# Patient Record
Sex: FEMALE | Race: WHITE | NOT HISPANIC OR LATINO | Employment: PART TIME | URBAN - NONMETROPOLITAN AREA
[De-identification: names, ages, dates, MRNs, and addresses within clinical notes are randomized per-mention and may not be internally consistent; named-entity substitution may affect disease eponyms.]

---

## 2023-09-26 ENCOUNTER — HOSPITAL ENCOUNTER (EMERGENCY)
Facility: HOSPITAL | Age: 23
Discharge: HOME | End: 2023-09-26
Attending: EMERGENCY MEDICINE | Admitting: EMERGENCY MEDICINE
Payer: COMMERCIAL

## 2023-09-26 ENCOUNTER — APPOINTMENT (EMERGENCY)
Dept: CT IMAGING | Facility: HOSPITAL | Age: 23
End: 2023-09-26
Payer: COMMERCIAL

## 2023-09-26 VITALS
WEIGHT: 254.63 LBS | HEART RATE: 82 BPM | SYSTOLIC BLOOD PRESSURE: 130 MMHG | TEMPERATURE: 98.1 F | OXYGEN SATURATION: 97 % | RESPIRATION RATE: 16 BRPM | DIASTOLIC BLOOD PRESSURE: 68 MMHG

## 2023-09-26 DIAGNOSIS — R19.00 PELVIC MASS: Primary | ICD-10-CM

## 2023-09-26 DIAGNOSIS — R19.00 ABDOMINAL MASS: Primary | ICD-10-CM

## 2023-09-26 LAB
ALBUMIN SERPL BCP-MCNC: 4.2 G/DL (ref 3.5–5)
ALP SERPL-CCNC: 89 U/L (ref 34–104)
ALT SERPL W P-5'-P-CCNC: 11 U/L (ref 7–52)
ANION GAP SERPL CALCULATED.3IONS-SCNC: 8 MMOL/L
AST SERPL W P-5'-P-CCNC: 10 U/L (ref 13–39)
BACTERIA UR QL AUTO: ABNORMAL /HPF
BASOPHILS # BLD AUTO: 0.02 THOUSANDS/ÂΜL (ref 0–0.1)
BASOPHILS NFR BLD AUTO: 0 % (ref 0–1)
BILIRUB SERPL-MCNC: 0.57 MG/DL (ref 0.2–1)
BILIRUB UR QL STRIP: NEGATIVE
BUN SERPL-MCNC: 11 MG/DL (ref 5–25)
CALCIUM SERPL-MCNC: 9.4 MG/DL (ref 8.4–10.2)
CHLORIDE SERPL-SCNC: 106 MMOL/L (ref 96–108)
CLARITY UR: ABNORMAL
CO2 SERPL-SCNC: 23 MMOL/L (ref 21–32)
COLOR UR: YELLOW
CREAT SERPL-MCNC: 0.92 MG/DL (ref 0.6–1.3)
EOSINOPHIL # BLD AUTO: 0.01 THOUSAND/ÂΜL (ref 0–0.61)
EOSINOPHIL NFR BLD AUTO: 0 % (ref 0–6)
ERYTHROCYTE [DISTWIDTH] IN BLOOD BY AUTOMATED COUNT: 14.4 % (ref 11.6–15.1)
EXT PREGNANCY TEST URINE: NEGATIVE
EXT. CONTROL: NORMAL
GFR SERPL CREATININE-BSD FRML MDRD: 87 ML/MIN/1.73SQ M
GLUCOSE SERPL-MCNC: 102 MG/DL (ref 65–140)
GLUCOSE UR STRIP-MCNC: NEGATIVE MG/DL
HCT VFR BLD AUTO: 37.3 % (ref 34.8–46.1)
HGB BLD-MCNC: 11.7 G/DL (ref 11.5–15.4)
HGB UR QL STRIP.AUTO: NEGATIVE
IMM GRANULOCYTES # BLD AUTO: 0.04 THOUSAND/UL (ref 0–0.2)
IMM GRANULOCYTES NFR BLD AUTO: 0 % (ref 0–2)
KETONES UR STRIP-MCNC: ABNORMAL MG/DL
LEUKOCYTE ESTERASE UR QL STRIP: ABNORMAL
LYMPHOCYTES # BLD AUTO: 1.16 THOUSANDS/ÂΜL (ref 0.6–4.47)
LYMPHOCYTES NFR BLD AUTO: 10 % (ref 14–44)
MCH RBC QN AUTO: 25.7 PG (ref 26.8–34.3)
MCHC RBC AUTO-ENTMCNC: 31.4 G/DL (ref 31.4–37.4)
MCV RBC AUTO: 82 FL (ref 82–98)
MONOCYTES # BLD AUTO: 0.54 THOUSAND/ÂΜL (ref 0.17–1.22)
MONOCYTES NFR BLD AUTO: 5 % (ref 4–12)
MUCOUS THREADS UR QL AUTO: ABNORMAL
NEUTROPHILS # BLD AUTO: 10.16 THOUSANDS/ÂΜL (ref 1.85–7.62)
NEUTS SEG NFR BLD AUTO: 85 % (ref 43–75)
NITRITE UR QL STRIP: NEGATIVE
NON-SQ EPI CELLS URNS QL MICRO: ABNORMAL /HPF
NRBC BLD AUTO-RTO: 0 /100 WBCS
PH UR STRIP.AUTO: 7.5 [PH]
PLATELET # BLD AUTO: 292 THOUSANDS/UL (ref 149–390)
PMV BLD AUTO: 10.6 FL (ref 8.9–12.7)
POTASSIUM SERPL-SCNC: 4 MMOL/L (ref 3.5–5.3)
PROT SERPL-MCNC: 7.5 G/DL (ref 6.4–8.4)
PROT UR STRIP-MCNC: NEGATIVE MG/DL
RBC # BLD AUTO: 4.55 MILLION/UL (ref 3.81–5.12)
RBC #/AREA URNS AUTO: ABNORMAL /HPF
SODIUM SERPL-SCNC: 137 MMOL/L (ref 135–147)
SP GR UR STRIP.AUTO: 1.01 (ref 1–1.03)
UROBILINOGEN UR QL STRIP.AUTO: 0.2 E.U./DL
WBC # BLD AUTO: 11.93 THOUSAND/UL (ref 4.31–10.16)
WBC #/AREA URNS AUTO: ABNORMAL /HPF

## 2023-09-26 PROCEDURE — G1004 CDSM NDSC: HCPCS

## 2023-09-26 PROCEDURE — 36415 COLL VENOUS BLD VENIPUNCTURE: CPT | Performed by: EMERGENCY MEDICINE

## 2023-09-26 PROCEDURE — 87491 CHLMYD TRACH DNA AMP PROBE: CPT | Performed by: EMERGENCY MEDICINE

## 2023-09-26 PROCEDURE — 96361 HYDRATE IV INFUSION ADD-ON: CPT

## 2023-09-26 PROCEDURE — 85025 COMPLETE CBC W/AUTO DIFF WBC: CPT | Performed by: EMERGENCY MEDICINE

## 2023-09-26 PROCEDURE — 87591 N.GONORRHOEAE DNA AMP PROB: CPT | Performed by: EMERGENCY MEDICINE

## 2023-09-26 PROCEDURE — 96374 THER/PROPH/DIAG INJ IV PUSH: CPT

## 2023-09-26 PROCEDURE — 99285 EMERGENCY DEPT VISIT HI MDM: CPT | Performed by: EMERGENCY MEDICINE

## 2023-09-26 PROCEDURE — 74177 CT ABD & PELVIS W/CONTRAST: CPT

## 2023-09-26 PROCEDURE — 81025 URINE PREGNANCY TEST: CPT | Performed by: EMERGENCY MEDICINE

## 2023-09-26 PROCEDURE — 80053 COMPREHEN METABOLIC PANEL: CPT | Performed by: EMERGENCY MEDICINE

## 2023-09-26 PROCEDURE — 99284 EMERGENCY DEPT VISIT MOD MDM: CPT

## 2023-09-26 PROCEDURE — 81001 URINALYSIS AUTO W/SCOPE: CPT | Performed by: EMERGENCY MEDICINE

## 2023-09-26 PROCEDURE — 96375 TX/PRO/DX INJ NEW DRUG ADDON: CPT

## 2023-09-26 RX ORDER — DROPERIDOL 2.5 MG/ML
1.25 INJECTION, SOLUTION INTRAMUSCULAR; INTRAVENOUS ONCE
Status: COMPLETED | OUTPATIENT
Start: 2023-09-26 | End: 2023-09-26

## 2023-09-26 RX ORDER — KETOROLAC TROMETHAMINE 30 MG/ML
15 INJECTION, SOLUTION INTRAMUSCULAR; INTRAVENOUS ONCE
Status: COMPLETED | OUTPATIENT
Start: 2023-09-26 | End: 2023-09-26

## 2023-09-26 RX ADMIN — SODIUM CHLORIDE 1000 ML: 0.9 INJECTION, SOLUTION INTRAVENOUS at 09:03

## 2023-09-26 RX ADMIN — DROPERIDOL 1.25 MG: 2.5 INJECTION, SOLUTION INTRAMUSCULAR; INTRAVENOUS at 09:03

## 2023-09-26 RX ADMIN — IOHEXOL 100 ML: 350 INJECTION, SOLUTION INTRAVENOUS at 10:04

## 2023-09-26 RX ADMIN — KETOROLAC TROMETHAMINE 15 MG: 30 INJECTION, SOLUTION INTRAMUSCULAR; INTRAVENOUS at 09:03

## 2023-09-26 NOTE — DISCHARGE INSTRUCTIONS
Probable ovarian mass/cyst  Please contact gynecologist oncologist today for appointment  Return immediately if worse or any new symptoms  Tylenol 1000 mg every 6 hours as needed  and/or  Advil 400 mg every 6 hours as needed  May take both together

## 2023-09-26 NOTE — Clinical Note
Cheri Baez was seen and treated in our emergency department on 9/26/2023. Diagnosis:     Ace Christian  may return to work on return date. She may return on this date: 09/28/2023         If you have any questions or concerns, please don't hesitate to call.       Donnie Kirby, DO    ______________________________           _______________          _______________  Hospital Representative                              Date                                Time

## 2023-09-26 NOTE — ED PROVIDER NOTES
History  Chief Complaint   Patient presents with   • Abdominal Pain     RLQ abdominal pain that started during the night and woke her up and developed nausea. 14-year-old female describes waking up hours prior to arrival with urge to urinate/stooling some right lower abdominal discomfort, only urinated, no lower urinary tract symptoms, no change in vaginal discharge. Pain became severe and is currently mild. No vomiting, diarrhea or constipation. Father stated she felt warm, no rigors. No prior episodes. LMP last week, normal, uses condoms and sometimes Plan B. No prior pregnancies or STIs      History provided by:  Patient  Abdominal Pain  Pain location:  RLQ  Pain quality: aching    Pain radiates to:  Does not radiate  Pain severity:  Mild  Timing:  Constant  Progression:  Waxing and waning  Chronicity:  New  Context: not sick contacts and not trauma    Relieved by:  None tried  Worsened by: Movement  Ineffective treatments:  None tried  Associated symptoms: no chest pain and no shortness of breath    Risk factors: not pregnant        None       Past Medical History:   Diagnosis Date   • ADHD    • Autism    • Psychiatric disorder        History reviewed. No pertinent surgical history. History reviewed. No pertinent family history. I have reviewed and agree with the history as documented. E-Cigarette/Vaping   • E-Cigarette Use Never User      E-Cigarette/Vaping Substances     Social History     Tobacco Use   • Smoking status: Never   • Smokeless tobacco: Never   Vaping Use   • Vaping Use: Never used   Substance Use Topics   • Alcohol use: Yes     Comment: occasional   • Drug use: Never       Review of Systems   Respiratory: Negative for shortness of breath. Cardiovascular: Negative for chest pain. Gastrointestinal: Positive for abdominal pain. Physical Exam  Physical Exam  Vitals and nursing note reviewed.    Constitutional:       Comments: Pleasant, comfortable-appearing   HENT: Head: Normocephalic and atraumatic. Mouth/Throat:      Mouth: Mucous membranes are moist.      Pharynx: Oropharynx is clear. Eyes:      Conjunctiva/sclera: Conjunctivae normal.      Pupils: Pupils are equal, round, and reactive to light. Cardiovascular:      Rate and Rhythm: Normal rate and regular rhythm. Heart sounds: Normal heart sounds. Pulmonary:      Effort: Pulmonary effort is normal.      Breath sounds: Normal breath sounds. Abdominal:      General: Bowel sounds are normal. There is no distension. Palpations: Abdomen is soft. Tenderness: There is abdominal tenderness in the right lower quadrant. There is no right CVA tenderness, left CVA tenderness, guarding or rebound. Comments: No epigastric or right upper quadrant tenderness, right middle and right lower abdomen tender, mildly obese, distended   Musculoskeletal:         General: No deformity. Cervical back: Neck supple. Skin:     General: Skin is warm and dry. Neurological:      General: No focal deficit present. Mental Status: She is alert and oriented to person, place, and time. Cranial Nerves: No cranial nerve deficit. Coordination: Coordination normal.   Psychiatric:         Behavior: Behavior normal.         Thought Content:  Thought content normal.         Judgment: Judgment normal.         Vital Signs  ED Triage Vitals [09/26/23 0849]   Temperature Pulse Respirations Blood Pressure SpO2   98.1 °F (36.7 °C) 103 16 138/85 95 %      Temp Source Heart Rate Source Patient Position - Orthostatic VS BP Location FiO2 (%)   Temporal Monitor Sitting Right arm --      Pain Score       6           Vitals:    09/26/23 0849 09/26/23 1149   BP: 138/85 130/68   Pulse: 103 82   Patient Position - Orthostatic VS: Sitting          Visual Acuity      ED Medications  Medications   sodium chloride 0.9 % bolus 1,000 mL (0 mL Intravenous Stopped 9/26/23 0955)   ketorolac (TORADOL) injection 15 mg (15 mg Intravenous Given 9/26/23 0903)   droperidol (INAPSINE) injection 1.25 mg (1.25 mg Intravenous Given 9/26/23 0903)   iohexol (OMNIPAQUE) 350 MG/ML injection (SINGLE-DOSE) 100 mL (100 mL Intravenous Given 9/26/23 1004)       Diagnostic Studies  Results Reviewed     Procedure Component Value Units Date/Time    Urine Microscopic [[id number]]  (Abnormal) Collected: 09/26/23 0955    Lab Status: Final result Specimen: Urine, Clean Catch Updated: 09/26/23 1026     RBC, UA 0-1 /hpf      WBC, UA 4-10 /hpf      Epithelial Cells Moderate /hpf      Bacteria, UA Moderate /hpf      MUCUS THREADS Occasional    UA w Reflex to Microscopic w Reflex to Culture [[id number]]  (Abnormal) Collected: 09/26/23 0955    Lab Status: Final result Specimen: Urine, Clean Catch Updated: 09/26/23 1008     Color, UA Yellow     Clarity, UA Slightly Cloudy     Specific Gravity, UA 1.015     pH, UA 7.5     Leukocytes, UA Trace     Nitrite, UA Negative     Protein, UA Negative mg/dl      Glucose, UA Negative mg/dl      Ketones, UA Trace mg/dl      Urobilinogen, UA 0.2 E.U./dl      Bilirubin, UA Negative     Occult Blood, UA Negative    Chlamydia/GC amplified DNA by PCR [[id number]] Collected: 09/26/23 0955    Lab Status:  In process Specimen: Urine, Other Updated: 09/26/23 1001    POCT pregnancy, urine [[id number]]  (Normal) Resulted: 09/26/23 0955    Lab Status: Final result Updated: 09/26/23 0955     EXT Preg Test, Ur Negative     Control Valid    Comprehensive metabolic panel [[id number]]  (Abnormal) Collected: 09/26/23 0903    Lab Status: Final result Specimen: Blood from Arm, Left Updated: 09/26/23 0926     Sodium 137 mmol/L      Potassium 4.0 mmol/L      Chloride 106 mmol/L      CO2 23 mmol/L      ANION GAP 8 mmol/L      BUN 11 mg/dL      Creatinine 0.92 mg/dL      Glucose 102 mg/dL      Calcium 9.4 mg/dL      AST 10 U/L      ALT 11 U/L      Alkaline Phosphatase 89 U/L      Total Protein 7.5 g/dL      Albumin 4.2 g/dL      Total Bilirubin 0.57 mg/dL      eGFR 87 ml/min/1.73sq m     Narrative:      National Kid[employer] Disease Foundation guidelines for Chronic Kidney Disease (CKD):   •  Stage 1 with normal or high GFR (GFR > 90 mL/min/1.73 square meters)  •  Stage 2 Mild CKD (GFR = 60-89 mL/min/1.73 square meters)  •  Stage 3A Moderate CKD (GFR = 45-59 mL/min/1.73 square meters)  •  Stage 3B Moderate CKD (GFR = 30-44 mL/min/1.73 square meters)  •  Stage 4 Severe CKD (GFR = 15-29 mL/min/1.73 square meters)  •  Stage 5 End Stage CKD (GFR <15 mL/min/1.73 square meters)  Note: GFR calculation is accurate only with a steady state creatinine    CBC and differential [[id number]]  (Abnormal) Collected: 09/26/23 0903    Lab Status: Final result Specimen: Blood from Arm, Left Updated: 09/26/23 0910     WBC 11.93 Thousand/uL      RBC 4.55 Million/uL      Hemoglobin 11.7 g/dL      Hematocrit 37.3 %      MCV 82 fL      MCH 25.7 pg      MCHC 31.4 g/dL      RDW 14.4 %      MPV 10.6 fL      Platelets 950 Thousands/uL      nRBC 0 /100 WBCs      Neutrophils Relative 85 %      Immat GRANS % 0 %      Lymphocytes Relative 10 %      Monocytes Relative 5 %      Eosinophils Relative 0 %      Basophils Relative 0 %      Neutrophils Absolute 10.16 Thousands/µL      Immature Grans Absolute 0.04 Thousand/uL      Lymphocytes Absolute 1.16 Thousands/µL      Monocytes Absolute 0.54 Thousand/µL      Eosinophils Absolute 0.01 Thousand/µL      Basophils Absolute 0.02 Thousands/µL                  CT abdomen pelvis with contrast   Final Result by Carmelo Olszewski, MD (09/26 6747)      1. Indeterminate large primarily cystic pelvic and abdominal mass with multiple septations measuring up to 26.3 x 17.6 x 34 cm as described above. The mass is felt to arise from the left ovary though a large exophytic fibroid is technically possible    though felt to be much less likely. Within the mass some of the septated regions have fluid attenuation content. 2.  Trace abdominal and pelvic free fluid.    3.  Small umbilical hernia with extension of small component of the septated abdominal/pelvic mass into the hernia sac. 4.  Moderate hiatal hernia. 5.  Results were discussed directly with Dr. Virgilio Chong at 10:50 a.m. on 9/26/2023. Workstation performed: [id number]                    Procedures  Procedures         ED Course  ED Course as of 09/26/23 1216   Tue Sep 26, 2023   0931 WBC(!): 11.93   1050 Leukocytes, UA(!): Trace   1050 Nitrite, UA: Negative   1050 Epithelial Cells(!): Moderate   1050 Bacteria, UA(!): Moderate   1050 CT abdomen pelvis with contrast  Radiology notes large pelvic mass   [facility]   1213 Patient remains comfortable for close outpatient follow-up and voices good understanding    Gynecology oncology Dr. James Gibbs agreed with close outpatient follow-up and appointment made via Diane DO with Dr. Eliane Pat 20yo+    [facility] Most Recent Value   Initial Alcohol Screen: US AUDIT-C     1. How often do you have a drink containing alcohol? 0 Filed at: 09/26/2023 0852   2. How many drinks containing alcohol do you have on a typical day you are drinking? 0 Filed at: 09/26/2023 0852   3b. FEMALE Any Age, or MALE 65+: How often do you have 4 or more drinks on one occassion? 0 Filed at: 09/26/2023 0852   Audit-C Score 0 Filed at: 09/26/2023 3471   BENJI: How many times in the past year have you. .. Used an illegal drug or used a prescription medication for non-medical reasons? Never Filed at: 09/26/2023 1901                    Medical Decision Making  Abdominal mass: acute illness or injury  Amount and/or Complexity of Data Reviewed  Labs: ordered. Decision-making details documented in ED Course. Radiology: ordered. Decision-making details documented in ED Course. ECG/medicine tests: ordered and independent interpretation performed. Decision-making details documented in ED Course.       Risk  Prescription drug management. Disposition  Final diagnoses:   Abdominal mass     Time reflects when diagnosis was documented in both MDM as applicable and the Disposition within this note     Time User Action Codes Description Comment    9/26/2023 12:08 PM Juan Trinh Add [R19.00] Abdominal mass       ED Disposition     ED Disposition   Discharge    Condition   Stable    Date/Time   Tue Sep 26, 2023 12:13 PM    Comment   Areli Gavin discharge to home/self care. Follow-up Information     Follow up With Specialties Details Why Contact Info    Rolan Patel MD Gynecologic Oncology On 10/2/2023 11:15 with Dr Jak Dickey [address] [place] [address]            Patient's Medications    No medications on file       No discharge procedures on file.     PDMP Review     None          ED Provider  Electronically Signed by           Juan Trinh DO  09/26/23 7785

## 2023-09-27 LAB
C TRACH DNA SPEC QL NAA+PROBE: NEGATIVE
N GONORRHOEA DNA SPEC QL NAA+PROBE: NEGATIVE

## 2023-10-02 ENCOUNTER — CONSULT (OUTPATIENT)
Dept: GYNECOLOGIC ONCOLOGY | Facility: CLINIC | Age: 23
End: 2023-10-02
Payer: COMMERCIAL

## 2023-10-02 VITALS
DIASTOLIC BLOOD PRESSURE: 78 MMHG | TEMPERATURE: 98 F | WEIGHT: 249 LBS | OXYGEN SATURATION: 96 % | RESPIRATION RATE: 16 BRPM | HEART RATE: 117 BPM | HEIGHT: 66 IN | SYSTOLIC BLOOD PRESSURE: 120 MMHG | BODY MASS INDEX: 40.02 KG/M2

## 2023-10-02 DIAGNOSIS — N83.8 OVARIAN MASS: Primary | ICD-10-CM

## 2023-10-02 PROCEDURE — 99245 OFF/OP CONSLTJ NEW/EST HI 55: CPT | Performed by: OBSTETRICS & GYNECOLOGY

## 2023-10-02 RX ORDER — ACETAMINOPHEN 325 MG/1
975 TABLET ORAL ONCE
OUTPATIENT
Start: 2023-10-02 | End: 2023-10-02

## 2023-10-02 RX ORDER — HEPARIN SODIUM 5000 [USP'U]/ML
5000 INJECTION, SOLUTION INTRAVENOUS; SUBCUTANEOUS
OUTPATIENT
Start: 2023-10-03 | End: 2023-10-04

## 2023-10-02 RX ORDER — CELECOXIB 200 MG/1
200 CAPSULE ORAL ONCE
OUTPATIENT
Start: 2023-10-02 | End: 2023-10-02

## 2023-10-02 RX ORDER — GABAPENTIN 100 MG/1
100 CAPSULE ORAL ONCE
OUTPATIENT
Start: 2023-10-02 | End: 2023-10-02

## 2023-10-02 RX ORDER — CEFAZOLIN SODIUM 2 G/50ML
2000 SOLUTION INTRAVENOUS ONCE
OUTPATIENT
Start: 2023-10-02 | End: 2023-10-02

## 2023-10-02 RX ORDER — METHYLPHENIDATE HYDROCHLORIDE 27 MG/1
27 TABLET, EXTENDED RELEASE ORAL DAILY
COMMUNITY

## 2023-10-02 NOTE — ASSESSMENT & PLAN NOTE
The patient has been recently diagnosed with a ovarian mass. We have discussed that this very large mass that should be removed through an open incision to minimize likelihood of rupture due to its significant thickenings within the mass. We have quoted a risk of malignancy between 10 and 20%. We have stated that there is a good chance that this may be a borderline tumor. The patient remains interested in fertility and all attempts unless malignancy is involving contralateral ovary and uterus will be made to preserve fertility. We have recommended the following:    Exploratory laparotomy, left salpingo-oophorectomy, possible modified staging procedure, possible ALISA/BSO and debulking. Have discussed risks and benefits of the procedure including bleeding requiring transfusion infection, infection, damage to local structures including bowel bladder ureter and other local organs. We discussed the risk of deep venous thrombosis. An open procedure may be required. All of these complications are in the 2-4% range of likelihood. The patient understands the risks and benefits of the procedure and has signed an informed consent. I personally signed the consent form with her. She does understand that further treatment including chemotherapy radiation therapy or hormones may be required based on the final postoperative pathologic diagnosis and staging. Standard preoperative testing including type and screen is CBC CPM P chest x-ray and EKG will be ordered. Any appropriate consultations for preoperative evaluation will also be ordered. Overall consultation took 60 min with greater than 50% in dedicated toward discussion time.

## 2023-10-02 NOTE — PROGRESS NOTES
Assessment/Plan:    Problem List Items Addressed This Visit        Other    Ovarian mass - Primary     The patient has been recently diagnosed with a ovarian mass. We have discussed that this very large mass that should be removed through an open incision to minimize likelihood of rupture due to its significant thickenings within the mass. We have quoted a risk of malignancy between 10 and 20%. We have stated that there is a good chance that this may be a borderline tumor. The patient remains interested in fertility and all attempts unless malignancy is involving contralateral ovary and uterus will be made to preserve fertility. We have recommended the following:    Exploratory laparotomy, left salpingo-oophorectomy, possible modified staging procedure, possible ALISA/BSO and debulking. Have discussed risks and benefits of the procedure including bleeding requiring transfusion infection, infection, damage to local structures including bowel bladder ureter and other local organs. We discussed the risk of deep venous thrombosis. An open procedure may be required. All of these complications are in the 2-4% range of likelihood. The patient understands the risks and benefits of the procedure and has signed an informed consent. I personally signed the consent form with her. She does understand that further treatment including chemotherapy radiation therapy or hormones may be required based on the final postoperative pathologic diagnosis and staging. Standard preoperative testing including type and screen is CBC CPM P chest x-ray and EKG will be ordered. Any appropriate consultations for preoperative evaluation will also be ordered. Overall consultation took 60 min with greater than 50% in dedicated toward discussion time.          Relevant Orders    Case request operating room: LAPAROTOMY EXPLORATORY left salpingo-oophorectomy possible modified staging possible ALISA/BSO possible staging (Completed)    Type and screen    CBC and differential    Comprehensive metabolic panel    HEMOGLOBIN A1C W/ EAG ESTIMATION    EKG 12 lead    XR chest pa & lateral           CHIEF COMPLAINT: Large ovarian mass        Patient ID: Kike Webb is a 24 y.o. female  Patient is a very pleasant 25-year-old female with a history of acute abdominal pain and was seen in the emergency department. Patient was seen in the emergency room and a CT scan of the abdomen and pelvis was performed revealing the following:  FINDINGS:     ABDOMEN     LOWER CHEST:  No clinically significant abnormality identified in the visualized lower chest.     LIVER/BILIARY TREE:  Unremarkable.     GALLBLADDER:  No calcified gallstones. No pericholecystic inflammatory change.     SPLEEN:  Unremarkable.     PANCREAS:  Unremarkable.     ADRENAL GLANDS:  Unremarkable.     KIDNEYS/URETERS:  Unremarkable. No hydronephrosis.     STOMACH AND BOWEL: Moderate hiatal hernia.     APPENDIX:  No findings to suggest appendicitis.     ABDOMINOPELVIC CAVITY: Trace abdominal and pelvic free fluid.     VESSELS:  Unremarkable for patient's age.     PELVIS     REPRODUCTIVE ORGANS: The uterus is unremarkable. The right ovary is unremarkable. Large primarily cystic pelvic and abdominal mass with multiple septations (some septations are thickened measuring up to 2.2 cm) measuring 26.3 x 17.6 x 34 cm. Within the   mass some of septated areas have fluid attenuation content. The mass is felt to arise from the left ovary, a large exophytic fibroid is technically possible though felt to be much less likely.     URINARY BLADDER:  Unremarkable.     ABDOMINAL WALL/INGUINAL REGIONS: Small umbilical hernia with extension of small component of the septated abdominal/pelvic mass into the hernia sac.     OSSEOUS STRUCTURES:  No acute fracture or destructive osseous lesion.     IMPRESSION:     1.   Indeterminate large primarily cystic pelvic and abdominal mass with multiple septations measuring up to 26.3 x 17.6 x 34 cm as described above. The mass is felt to arise from the left ovary though a large exophytic fibroid is technically possible   though felt to be much less likely. Within the mass some of the septated regions have fluid attenuation content. 2.  Trace abdominal and pelvic free fluid. 3.  Small umbilical hernia with extension of small component of the septated abdominal/pelvic mass into the hernia sac. 4.  Moderate hiatal hernia. 5.  Results were discussed directly with Dr. Marek Richardson at 10:50 a.m. on 9/26/2023. Patient reports that her abdominal pain has predominantly resolved. She does note some mild abdominal bloating but she just felt that she was getting fat. Patient reports that she has had weight fluctuations for the majority of her lifetime. Patient has recently been on oral contraceptive but has discontinued them and this has resulted in some intermenstrual bleeding. This is new. Patient is presently sexually active and is using condoms for birth control. She discontinued hormonal contraception due to the fact that she felt that she was having weight gain. The patient does desire further fertility. Today, the patient is doing well. She denies significant abdominal pain, pelvic pain, nausea, vomiting, constipation, diarrhea, fevers, chills, or vaginal bleeding. Can we just change it back from the on pro? I am not sure why this was changed either. I written      Review of Systems   Constitutional: Negative. HENT: Negative. Eyes: Negative. Respiratory: Negative. Cardiovascular: Negative. Gastrointestinal: Positive for abdominal distention. Endocrine: Negative. Genitourinary: Negative. Musculoskeletal: Negative. Skin: Negative. Neurological: Negative. Hematological: Negative. Psychiatric/Behavioral: Negative.         Current Outpatient Medications   Medication Sig Dispense Refill   • Methylphenidate HCl ER 27 MG TB24 Take 27 mg by mouth daily No current facility-administered medications for this visit. No Known Allergies    Past Medical History:   Diagnosis Date   • ADHD    • Autism    • Psychiatric disorder        History reviewed. No pertinent surgical history. OB History        0    Para   0    Term   0       0    AB   0    Living   0       SAB   0    IAB   0    Ectopic   0    Multiple   0    Live Births   0                 History reviewed. No pertinent family history. The following portions of the patient's history were reviewed and updated as appropriate: allergies, current medications, past family history, past social history, past surgical history and problem list.      Objective:    Blood pressure 120/78, pulse (!) 117, temperature 98 °F (36.7 °C), temperature source Temporal, resp. rate 16, height 5' 6" (1.676 m), weight 113 kg (249 lb), last menstrual period 2023, SpO2 96 %. Body mass index is 40.19 kg/m². Physical Exam  Constitutional:       Appearance: She is well-developed. HENT:      Head: Normocephalic and atraumatic. Eyes:      Pupils: Pupils are equal, round, and reactive to light. Cardiovascular:      Rate and Rhythm: Normal rate and regular rhythm. Heart sounds: Normal heart sounds. Pulmonary:      Effort: Pulmonary effort is normal. No respiratory distress. Breath sounds: Normal breath sounds. Abdominal:      General: Bowel sounds are normal. There is no distension. Palpations: Abdomen is soft. Abdomen is not rigid. There is mass. Tenderness: There is no abdominal tenderness. There is no guarding or rebound. Comments: Large abdominal mass measuring 30 x 20 cm filling up the entire upper abdomen. It is not palpable in the pelvis. Genitourinary:     Comments: -Normal external female genitalia, normal Bartholin's and Mabel's glands                  -Normal midline urethral meatus.  No lesions notes                  -Bladder without fullness mass or tenderness                  -Vagina without lesion or discharge No significant cystocele or rectocele noted                  -Cervix normal appearing without visible lesions                  -Uterus with normal contour, mobility. No tenderness,                  -Adnexae without  mass or tenderness                  - Anus without fissure of lesion    Musculoskeletal:         General: Normal range of motion. Cervical back: Normal range of motion and neck supple. Lymphadenopathy:      Cervical: No cervical adenopathy. Upper Body:      Right upper body: No supraclavicular adenopathy. Left upper body: No supraclavicular adenopathy. Skin:     General: Skin is warm and dry. Neurological:      Mental Status: She is alert and oriented to person, place, and time.    Psychiatric:         Behavior: Behavior normal.           No results found for: ""  Lab Results   Component Value Date    WBC 11.93 (H) 09/26/2023    HGB 11.7 09/26/2023    HCT 37.3 09/26/2023    MCV 82 09/26/2023     09/26/2023     Lab Results   Component Value Date    K 4.0 09/26/2023     09/26/2023    CO2 23 09/26/2023    BUN 11 09/26/2023    CREATININE 0.92 09/26/2023    CALCIUM 9.4 09/26/2023    AST 10 (L) 09/26/2023    ALT 11 09/26/2023    ALKPHOS 89 09/26/2023    EGFR 87 09/26/2023        Trend:  No results found for: ""

## 2023-10-02 NOTE — LETTER
October 2, 2023     Elizabethsabino Rodriguez, DO  [facility]    Patient: Laura Leigh   YOB: 2000   Date of Visit: 10/2/2023       Dear Dr. Lee Hester:    Thank you for referring Ruben Avalos to me for evaluation. Below are my notes for this consultation. If you have questions, please do not hesitate to call me. I look forward to following your patient along with you. Sincerely,        Cristobal Silverio MD        CC: No Recipients    Cristobal Silverio MD  10/2/2023 12:18 PM  Sign when Signing Visit  Assessment/Plan:    Problem List Items Addressed This Visit          Other    Ovarian mass - Primary     The patient has been recently diagnosed with a ovarian mass. We have discussed that this very large mass that should be removed through an open incision to minimize likelihood of rupture due to its significant thickenings within the mass. We have quoted a risk of malignancy between 10 and 20%. We have stated that there is a good chance that this may be a borderline tumor. The patient remains interested in fertility and all attempts unless malignancy is involving contralateral ovary and uterus will be made to preserve fertility. We have recommended the following:    Exploratory laparotomy, left salpingo-oophorectomy, possible modified staging procedure, possible ALISA/BSO and debulking. Have discussed risks and benefits of the procedure including bleeding requiring transfusion infection, infection, damage to local structures including bowel bladder ureter and other local organs. We discussed the risk of deep venous thrombosis. An open procedure may be required. All of these complications are in the 2-4% range of likelihood. The patient understands the risks and benefits of the procedure and has signed an informed consent. I personally signed the consent form with her.   She does understand that further treatment including chemotherapy radiation therapy or hormones may be required based on the final postoperative pathologic diagnosis and staging. Standard preoperative testing including type and screen is CBC CPM P chest x-ray and EKG will be ordered. Any appropriate consultations for preoperative evaluation will also be ordered. Overall consultation took 60 min with greater than 50% in dedicated toward discussion time. Relevant Orders    Case request operating room: LAPAROTOMY EXPLORATORY left salpingo-oophorectomy possible modified staging possible ALISA/BSO possible staging (Completed)    Type and screen    CBC and differential    Comprehensive metabolic panel    HEMOGLOBIN A1C W/ EAG ESTIMATION    EKG 12 lead    XR chest pa & lateral           CHIEF COMPLAINT: Large ovarian mass        Patient ID: Suzanne Muñoz is a 24 y.o. female  Patient is a very pleasant 27-year-old female with a history of acute abdominal pain and was seen in the emergency department. Patient was seen in the emergency room and a CT scan of the abdomen and pelvis was performed revealing the following:  FINDINGS:     ABDOMEN     LOWER CHEST:  No clinically significant abnormality identified in the visualized lower chest.     LIVER/BILIARY TREE:  Unremarkable. GALLBLADDER:  No calcified gallstones. No pericholecystic inflammatory change. SPLEEN:  Unremarkable. PANCREAS:  Unremarkable. ADRENAL GLANDS:  Unremarkable. KIDNEYS/URETERS:  Unremarkable. No hydronephrosis. STOMACH AND BOWEL: Moderate hiatal hernia. APPENDIX:  No findings to suggest appendicitis. ABDOMINOPELVIC CAVITY: Trace abdominal and pelvic free fluid. VESSELS:  Unremarkable for patient's age. PELVIS     REPRODUCTIVE ORGANS: The uterus is unremarkable. The right ovary is unremarkable. Large primarily cystic pelvic and abdominal mass with multiple septations (some septations are thickened measuring up to 2.2 cm) measuring 26.3 x 17.6 x 34 cm.  Within the   mass some of septated areas have fluid attenuation content. The mass is felt to arise from the left ovary, a large exophytic fibroid is technically possible though felt to be much less likely. URINARY BLADDER:  Unremarkable. ABDOMINAL WALL/INGUINAL REGIONS: Small umbilical hernia with extension of small component of the septated abdominal/pelvic mass into the hernia sac. OSSEOUS STRUCTURES:  No acute fracture or destructive osseous lesion. IMPRESSION:     1. Indeterminate large primarily cystic pelvic and abdominal mass with multiple septations measuring up to 26.3 x 17.6 x 34 cm as described above. The mass is felt to arise from the left ovary though a large exophytic fibroid is technically possible   though felt to be much less likely. Within the mass some of the septated regions have fluid attenuation content. 2.  Trace abdominal and pelvic free fluid. 3.  Small umbilical hernia with extension of small component of the septated abdominal/pelvic mass into the hernia sac. 4.  Moderate hiatal hernia. 5.  Results were discussed directly with Dr. Tiffanie Phillips at 10:50 a.m. on 9/26/2023. Patient reports that her abdominal pain has predominantly resolved. She does note some mild abdominal bloating but she just felt that she was getting fat. Patient reports that she has had weight fluctuations for the majority of her lifetime. Patient has recently been on oral contraceptive but has discontinued them and this has resulted in some intermenstrual bleeding. This is new. Patient is presently sexually active and is using condoms for birth control. She discontinued hormonal contraception due to the fact that she felt that she was having weight gain. The patient does desire further fertility. Today, the patient is doing well. She denies significant abdominal pain, pelvic pain, nausea, vomiting, constipation, diarrhea, fevers, chills, or vaginal bleeding. Can we just change it back from the on pro?   I am not sure why this was changed either. I written      Review of Systems   Constitutional: Negative. HENT: Negative. Eyes: Negative. Respiratory: Negative. Cardiovascular: Negative. Gastrointestinal: Positive for abdominal distention. Endocrine: Negative. Genitourinary: Negative. Musculoskeletal: Negative. Skin: Negative. Neurological: Negative. Hematological: Negative. Psychiatric/Behavioral: Negative. Current Outpatient Medications   Medication Sig Dispense Refill   • Methylphenidate HCl ER 27 MG TB24 Take 27 mg by mouth daily       No current facility-administered medications for this visit. No Known Allergies    Past Medical History:   Diagnosis Date   • ADHD    • Autism    • Psychiatric disorder        History reviewed. No pertinent surgical history. OB History          0    Para   0    Term   0       0    AB   0    Living   0         SAB   0    IAB   0    Ectopic   0    Multiple   0    Live Births   0                 History reviewed. No pertinent family history. The following portions of the patient's history were reviewed and updated as appropriate: allergies, current medications, past family history, past social history, past surgical history and problem list.      Objective:    Blood pressure 120/78, pulse (!) 117, temperature 98 °F (36.7 °C), temperature source Temporal, resp. rate 16, height 5' 6" (1.676 m), weight 113 kg (249 lb), last menstrual period 2023, SpO2 96 %. Body mass index is 40.19 kg/m². Physical Exam  Constitutional:       Appearance: She is well-developed. HENT:      Head: Normocephalic and atraumatic. Eyes:      Pupils: Pupils are equal, round, and reactive to light. Cardiovascular:      Rate and Rhythm: Normal rate and regular rhythm. Heart sounds: Normal heart sounds. Pulmonary:      Effort: Pulmonary effort is normal. No respiratory distress. Breath sounds: Normal breath sounds.    Abdominal:      General: Bowel sounds are normal. There is no distension. Palpations: Abdomen is soft. Abdomen is not rigid. There is mass. Tenderness: There is no abdominal tenderness. There is no guarding or rebound. Comments: Large abdominal mass measuring 30 x 20 cm filling up the entire upper abdomen. It is not palpable in the pelvis. Genitourinary:     Comments: -Normal external female genitalia, normal Bartholin's and Wyaconda's glands                  -Normal midline urethral meatus. No lesions notes                  -Bladder without fullness mass or tenderness                  -Vagina without lesion or discharge No significant cystocele or rectocele noted                  -Cervix normal appearing without visible lesions                  -Uterus with normal contour, mobility. No tenderness,                  -Adnexae without  mass or tenderness                  - Anus without fissure of lesion    Musculoskeletal:         General: Normal range of motion. Cervical back: Normal range of motion and neck supple. Lymphadenopathy:      Cervical: No cervical adenopathy. Upper Body:      Right upper body: No supraclavicular adenopathy. Left upper body: No supraclavicular adenopathy. Skin:     General: Skin is warm and dry. Neurological:      Mental Status: She is alert and oriented to person, place, and time.    Psychiatric:         Behavior: Behavior normal.           No results found for: ""  Lab Results   Component Value Date    WBC 11.93 (H) 09/26/2023    HGB 11.7 09/26/2023    HCT 37.3 09/26/2023    MCV 82 09/26/2023     09/26/2023     Lab Results   Component Value Date    K 4.0 09/26/2023     09/26/2023    CO2 23 09/26/2023    BUN 11 09/26/2023    CREATININE 0.92 09/26/2023    CALCIUM 9.4 09/26/2023    AST 10 (L) 09/26/2023    ALT 11 09/26/2023    ALKPHOS 89 09/26/2023    EGFR 87 09/26/2023        Trend:  No results found for: ""

## 2023-10-04 ENCOUNTER — OFFICE VISIT (OUTPATIENT)
Dept: LAB | Facility: HOSPITAL | Age: 23
End: 2023-10-04
Payer: COMMERCIAL

## 2023-10-04 ENCOUNTER — HOSPITAL ENCOUNTER (OUTPATIENT)
Dept: RADIOLOGY | Facility: HOSPITAL | Age: 23
Discharge: HOME | End: 2023-10-04
Payer: COMMERCIAL

## 2023-10-04 ENCOUNTER — APPOINTMENT (OUTPATIENT)
Dept: LAB | Facility: HOSPITAL | Age: 23
End: 2023-10-04
Payer: COMMERCIAL

## 2023-10-04 DIAGNOSIS — N83.8 OVARIAN MASS: ICD-10-CM

## 2023-10-04 LAB
ALBUMIN SERPL BCP-MCNC: 4.1 G/DL (ref 3.5–5)
ALP SERPL-CCNC: 81 U/L (ref 34–104)
ALT SERPL W P-5'-P-CCNC: 12 U/L (ref 7–52)
ANION GAP SERPL CALCULATED.3IONS-SCNC: 6 MMOL/L
AST SERPL W P-5'-P-CCNC: 11 U/L (ref 13–39)
ATRIAL RATE: 86 BPM
BASOPHILS # BLD AUTO: 0.01 THOUSANDS/ÂΜL (ref 0–0.1)
BASOPHILS NFR BLD AUTO: 0 % (ref 0–1)
BILIRUB SERPL-MCNC: 0.48 MG/DL (ref 0.2–1)
BUN SERPL-MCNC: 13 MG/DL (ref 5–25)
CALCIUM SERPL-MCNC: 9.1 MG/DL (ref 8.4–10.2)
CHLORIDE SERPL-SCNC: 106 MMOL/L (ref 96–108)
CO2 SERPL-SCNC: 25 MMOL/L (ref 21–32)
CREAT SERPL-MCNC: 0.96 MG/DL (ref 0.6–1.3)
EOSINOPHIL # BLD AUTO: 0.09 THOUSAND/ÂΜL (ref 0–0.61)
EOSINOPHIL NFR BLD AUTO: 1 % (ref 0–6)
ERYTHROCYTE [DISTWIDTH] IN BLOOD BY AUTOMATED COUNT: 14.7 % (ref 11.6–15.1)
EST. AVERAGE GLUCOSE BLD GHB EST-MCNC: 120 MG/DL
GFR SERPL CREATININE-BSD FRML MDRD: 83 ML/MIN/1.73SQ M
GLUCOSE P FAST SERPL-MCNC: 85 MG/DL (ref 65–99)
HBA1C MFR BLD: 5.8 %
HCT VFR BLD AUTO: 39.3 % (ref 34.8–46.1)
HGB BLD-MCNC: 11.8 G/DL (ref 11.5–15.4)
IMM GRANULOCYTES # BLD AUTO: 0.03 THOUSAND/UL (ref 0–0.2)
IMM GRANULOCYTES NFR BLD AUTO: 0 % (ref 0–2)
LYMPHOCYTES # BLD AUTO: 2.71 THOUSANDS/ÂΜL (ref 0.6–4.47)
LYMPHOCYTES NFR BLD AUTO: 27 % (ref 14–44)
MCH RBC QN AUTO: 25.4 PG (ref 26.8–34.3)
MCHC RBC AUTO-ENTMCNC: 30 G/DL (ref 31.4–37.4)
MCV RBC AUTO: 85 FL (ref 82–98)
MONOCYTES # BLD AUTO: 0.66 THOUSAND/ÂΜL (ref 0.17–1.22)
MONOCYTES NFR BLD AUTO: 7 % (ref 4–12)
NEUTROPHILS # BLD AUTO: 6.4 THOUSANDS/ÂΜL (ref 1.85–7.62)
NEUTS SEG NFR BLD AUTO: 65 % (ref 43–75)
NRBC BLD AUTO-RTO: 0 /100 WBCS
P AXIS: 31 DEGREES
PLATELET # BLD AUTO: 322 THOUSANDS/UL (ref 149–390)
PMV BLD AUTO: 10.6 FL (ref 8.9–12.7)
POTASSIUM SERPL-SCNC: 4.2 MMOL/L (ref 3.5–5.3)
PR INTERVAL: 138 MS
PROT SERPL-MCNC: 7.3 G/DL (ref 6.4–8.4)
QRS AXIS: 24 DEGREES
QRSD INTERVAL: 82 MS
QT INTERVAL: 364 MS
QTC INTERVAL: 435 MS
RBC # BLD AUTO: 4.64 MILLION/UL (ref 3.81–5.12)
SODIUM SERPL-SCNC: 137 MMOL/L (ref 135–147)
T WAVE AXIS: 38 DEGREES
VENTRICULAR RATE: 86 BPM
WBC # BLD AUTO: 9.9 THOUSAND/UL (ref 4.31–10.16)

## 2023-10-04 PROCEDURE — 71046 X-RAY EXAM CHEST 2 VIEWS: CPT

## 2023-10-04 PROCEDURE — 86900 BLOOD TYPING SEROLOGIC ABO: CPT

## 2023-10-04 PROCEDURE — 83036 HEMOGLOBIN GLYCOSYLATED A1C: CPT

## 2023-10-04 PROCEDURE — 36415 COLL VENOUS BLD VENIPUNCTURE: CPT

## 2023-10-04 PROCEDURE — 86850 RBC ANTIBODY SCREEN: CPT

## 2023-10-04 PROCEDURE — 85025 COMPLETE CBC W/AUTO DIFF WBC: CPT

## 2023-10-04 PROCEDURE — 86901 BLOOD TYPING SEROLOGIC RH(D): CPT

## 2023-10-04 PROCEDURE — 80053 COMPREHEN METABOLIC PANEL: CPT

## 2023-10-04 PROCEDURE — 93005 ELECTROCARDIOGRAM TRACING: CPT

## 2023-10-05 LAB
ABO GROUP BLD: NORMAL
BLD GP AB SCN SERPL QL: NEGATIVE
RH BLD: POSITIVE
SPECIMEN EXPIRATION DATE: NORMAL

## 2023-10-06 ENCOUNTER — TELEPHONE (OUTPATIENT)
Dept: GYNECOLOGIC ONCOLOGY | Facility: CLINIC | Age: 23
End: 2023-10-06

## 2023-10-06 NOTE — TELEPHONE ENCOUNTER
Left message for patient to call back to schedule surgical procedure.  Please transfer to [facility] # [phone]

## 2023-10-09 ENCOUNTER — TELEPHONE (OUTPATIENT)
Dept: HEMATOLOGY ONCOLOGY | Facility: CLINIC | Age: 23
End: 2023-10-09

## 2023-10-09 NOTE — TELEPHONE ENCOUNTER
Patient Call    Who are you speaking with? self   If it is not the patient, are they listed on an active communication consent form? self   What is the reason for this call? Questions about procedure and stay   Does this require a call back? yes   If a call back is required, please list best call back number [phone]   If a call back is required, advise that a message will be forwarded to their care team and someone will return their call as soon as possible. Did you relay this information to the patient?  yes

## 2023-10-10 ENCOUNTER — ANESTHESIA EVENT (OUTPATIENT)
Dept: PERIOP | Facility: HOSPITAL | Age: 23
DRG: 742 | End: 2023-10-10
Payer: COMMERCIAL

## 2023-10-17 RX ORDER — CITALOPRAM 40 MG/1
40 TABLET ORAL DAILY
COMMUNITY
Start: 2023-10-04

## 2023-10-17 NOTE — PRE-PROCEDURE INSTRUCTIONS
Pre-Surgery Instructions:   Medication Instructions    citalopram (CeleXA) 40 mg tablet Take day of surgery. Methylphenidate HCl ER 27 MG TB24 Hold day of surgery. Medication instructions for day surgery reviewed. Please use only a sip of water to take your instructed medications. Avoid all over the counter vitamins, supplements and NSAIDS for one week prior to surgery per anesthesia guidelines. Tylenol is ok to take as needed. You will receive a call one business day prior to surgery with an arrival time and hospital directions. If your surgery is scheduled on a Monday, the hospital will be calling you on the Friday prior to your surgery. If you have not heard from anyone by 8pm, please call the hospital supervisor through the hospital  at [phone]. [place] Pace [phone]). Do not eat or drink anything after midnight the night before your surgery, including candy, mints, lifesavers, or chewing gum. Do not drink alcohol 24hrs before your surgery. Try not to smoke at least 24hrs before your surgery. Follow the pre surgery showering instructions as listed in the [facility] Surgical Experience Booklet” or otherwise provided by your surgeon's office. Do not shave the surgical area 24 hours before surgery. Do not apply any lotions, creams, including makeup, cologne, deodorant, or perfumes after showering on the day of your surgery. No contact lenses, eye make-up, or artificial eyelashes. Remove nail polish, including gel polish, and any artificial, gel, or acrylic nails if possible. Remove all jewelry including rings and body piercing jewelry. Wear causal clothing that is easy to take on and off. Consider your type of surgery. Keep any valuables, jewelry, piercings at home. Please bring any specially ordered equipment (sling, braces) if indicated. Arrange for a responsible person to drive you to and from the hospital on the day of your surgery. Visitor Guidelines discussed.      Call the surgeon's office with any new illnesses, exposures, or additional questions prior to surgery. Please reference your [facility] Surgical Experience Booklet” for additional information to prepare for your upcoming surgery. Aware of 3 Ensure drinks pre op with instructions reviewed.

## 2023-10-24 ENCOUNTER — TELEPHONE (OUTPATIENT)
Dept: HEMATOLOGY ONCOLOGY | Facility: CLINIC | Age: 23
End: 2023-10-24

## 2023-10-24 NOTE — TELEPHONE ENCOUNTER
Patient Call    Who are you speaking with? [employer]     If it is not the patient, are they listed on an active communication consent form? N/A   What is the reason for this call? Stephanie Gupta calling in for our fax number as well as to verify the patient is a patient of Dr Kan Prescott   Does this require a call back? No   If a call back is required, please list best call back number N/A   If a call back is required, advise that a message will be forwarded to their care team and someone will return their call as soon as possible. Did you relay this information to the patient?  No

## 2023-10-24 NOTE — TELEPHONE ENCOUNTER
Patient calling in wanting to know if Dr Wendy Talley received the documents for her FMLA, and if he was able to complete them.

## 2023-10-25 ENCOUNTER — TELEPHONE (OUTPATIENT)
Dept: GYNECOLOGIC ONCOLOGY | Facility: CLINIC | Age: 23
End: 2023-10-25

## 2023-10-25 NOTE — TELEPHONE ENCOUNTER
Called to inform patient that I have not received any la paperwork from her employer. I left the fax number on the patient's voicemail for the paperwork to be refaxed so it can be filled out.

## 2023-10-26 ENCOUNTER — TELEPHONE (OUTPATIENT)
Dept: HEMATOLOGY ONCOLOGY | Facility: CLINIC | Age: 23
End: 2023-10-26

## 2023-10-26 NOTE — TELEPHONE ENCOUNTER
Call Transfer   Who are you speaking with?   Doug Hurt   If it is not the patient, are they listed on an active communication consent form? N/A   Who is the patients HemOnc/SurgOnc provider? Dr. Gab Boyer   What is the reason for this call? Fmla paperwork   Person/Department that the call was transferred to? Time that call was transferred? Cole Barriga   Your call will be transferred now. If you receive a voicemail, please leave a detailed message and a member of the team will return your call as soon as possible. Did you relay this information to the caller?   N/A

## 2023-11-06 ENCOUNTER — TELEPHONE (OUTPATIENT)
Dept: HEMATOLOGY ONCOLOGY | Facility: CLINIC | Age: 23
End: 2023-11-06

## 2023-11-06 PROBLEM — F41.9 ANXIETY: Status: ACTIVE | Noted: 2023-11-06

## 2023-11-06 PROCEDURE — NC001 PR NO CHARGE: Performed by: OBSTETRICS & GYNECOLOGY

## 2023-11-06 NOTE — ANESTHESIA PREPROCEDURE EVALUATION
Procedure:  LAPAROTOMY EXPLORATORY (Abdomen)  SALPINGO-OOPHORECTOMY, PSB MODIFIED STAGING (Left: Abdomen)  (ALISA), BILATERAL SALPINGO-OOPHORECTOMY, STAGING (Abdomen)    Relevant Problems   ANESTHESIA (within normal limits)      CARDIO (within normal limits)      ENDO (within normal limits)      GI/HEPATIC (within normal limits)      GYN  Ovarian mass      HEMATOLOGY (within normal limits)      MUSCULOSKELETAL (within normal limits)      NEURO/PSYCH  ADHD      Autism    (+) Anxiety      PULMONARY (within normal limits)      Other   (+) Ovarian mass        Physical Exam    Airway    Mallampati score: II         Dental       Cardiovascular  Rhythm: regular, Rate: normal, Cardiovascular exam normal    Pulmonary  Pulmonary exam normal Breath sounds clear to auscultation    Other Findings        Anesthesia Plan  ASA Score- 2     Anesthesia Type- general with ASA Monitors. Additional Monitors:     Airway Plan: ETT. Comment: Epidural for post op pain management . Plan Factors-    Chart reviewed. Existing labs reviewed. Patient summary reviewed. Induction- intravenous. Postoperative Plan- Plan for postoperative opioid use.      Informed Consent-

## 2023-11-06 NOTE — H&P
Assessment/Plan:         Problem List Items Addressed This Visit               Other     Ovarian mass - Primary       The patient has been recently diagnosed with a ovarian mass. We have discussed that this very large mass that should be removed through an open incision to minimize likelihood of rupture due to its significant thickenings within the mass. We have quoted a risk of malignancy between 10 and 20%. We have stated that there is a good chance that this may be a borderline tumor. The patient remains interested in fertility and all attempts unless malignancy is involving contralateral ovary and uterus will be made to preserve fertility. We have recommended the following:     Exploratory laparotomy, left salpingo-oophorectomy, possible modified staging procedure, possible ALISA/BSO and debulking. Have discussed risks and benefits of the procedure including bleeding requiring transfusion infection, infection, damage to local structures including bowel bladder ureter and other local organs. We discussed the risk of deep venous thrombosis. An open procedure may be required. All of these complications are in the 2-4% range of likelihood. The patient understands the risks and benefits of the procedure and has signed an informed consent. I personally signed the consent form with her. She does understand that further treatment including chemotherapy radiation therapy or hormones may be required based on the final postoperative pathologic diagnosis and staging. Standard preoperative testing including type and screen is CBC CPM P chest x-ray and EKG will be ordered. Any appropriate consultations for preoperative evaluation will also be ordered. Overall consultation took 60 min with greater than 50% in dedicated toward discussion time.            Relevant Orders     Case request operating room: LAPAROTOMY EXPLORATORY left salpingo-oophorectomy possible modified staging possible ALISA/BSO possible staging (Completed)     Type and screen     CBC and differential     Comprehensive metabolic panel     HEMOGLOBIN A1C W/ EAG ESTIMATION     EKG 12 lead     XR chest pa & lateral               CHIEF COMPLAINT: Large ovarian mass           Patient ID: Lucy Gallardo is a 24 y.o. female  Patient is a very pleasant 40-year-old female with a history of acute abdominal pain and was seen in the emergency department. Patient was seen in the emergency room and a CT scan of the abdomen and pelvis was performed revealing the following:  FINDINGS:     ABDOMEN     LOWER CHEST:  No clinically significant abnormality identified in the visualized lower chest.     LIVER/BILIARY TREE:  Unremarkable. GALLBLADDER:  No calcified gallstones. No pericholecystic inflammatory change. SPLEEN:  Unremarkable. PANCREAS:  Unremarkable. ADRENAL GLANDS:  Unremarkable. KIDNEYS/URETERS:  Unremarkable. No hydronephrosis. STOMACH AND BOWEL: Moderate hiatal hernia. APPENDIX:  No findings to suggest appendicitis. ABDOMINOPELVIC CAVITY: Trace abdominal and pelvic free fluid. VESSELS:  Unremarkable for patient's age. PELVIS     REPRODUCTIVE ORGANS: The uterus is unremarkable. The right ovary is unremarkable. Large primarily cystic pelvic and abdominal mass with multiple septations (some septations are thickened measuring up to 2.2 cm) measuring 26.3 x 17.6 x 34 cm. Within the   mass some of septated areas have fluid attenuation content. The mass is felt to arise from the left ovary, a large exophytic fibroid is technically possible though felt to be much less likely. URINARY BLADDER:  Unremarkable. ABDOMINAL WALL/INGUINAL REGIONS: Small umbilical hernia with extension of small component of the septated abdominal/pelvic mass into the hernia sac. OSSEOUS STRUCTURES:  No acute fracture or destructive osseous lesion. IMPRESSION:     1.   Indeterminate large primarily cystic pelvic and abdominal mass with multiple septations measuring up to 26.3 x 17.6 x 34 cm as described above. The mass is felt to arise from the left ovary though a large exophytic fibroid is technically possible   though felt to be much less likely. Within the mass some of the septated regions have fluid attenuation content. 2.  Trace abdominal and pelvic free fluid. 3.  Small umbilical hernia with extension of small component of the septated abdominal/pelvic mass into the hernia sac. 4.  Moderate hiatal hernia. 5.  Results were discussed directly with Dr. Alexy Bustillos at 10:50 a.m. on 9/26/2023. Patient reports that her abdominal pain has predominantly resolved. She does note some mild abdominal bloating but she just felt that she was getting fat. Patient reports that she has had weight fluctuations for the majority of her lifetime. Patient has recently been on oral contraceptive but has discontinued them and this has resulted in some intermenstrual bleeding. This is new. Patient is presently sexually active and is using condoms for birth control. She discontinued hormonal contraception due to the fact that she felt that she was having weight gain. The patient does desire further fertility. Today, the patient is doing well. She denies significant abdominal pain, pelvic pain, nausea, vomiting, constipation, diarrhea, fevers, chills, or vaginal bleeding. Can we just change it back from the on pro? I am not sure why this was changed either. I written        Review of Systems  Constitutional: Negative. HENT: Negative. Eyes: Negative. Respiratory: Negative. Cardiovascular: Negative. Gastrointestinal: Positive for abdominal distention. Endocrine: Negative. Genitourinary: Negative. Musculoskeletal: Negative. Skin: Negative. Neurological: Negative. Hematological: Negative. Psychiatric/Behavioral: Negative.           Current Medications          Current Outpatient Medications   Medication Sig Dispense Refill    Methylphenidate HCl ER 27 MG TB24 Take 27 mg by mouth daily          No current facility-administered medications for this visit. No Known Allergies     Medical History        Past Medical History:   Diagnosis Date    ADHD      Autism      Psychiatric disorder              Surgical History   History reviewed. No pertinent surgical history. OB History         0    Para   0    Term   0       0    AB   0    Living   0        SAB   0    IAB   0    Ectopic   0    Multiple   0    Live Births   0                     Family History   History reviewed. No pertinent family history. The following portions of the patient's history were reviewed and updated as appropriate: allergies, current medications, past family history, past social history, past surgical history and problem list.        Objective:     Blood pressure 120/78, pulse (!) 117, temperature 98 °F (36.7 °C), temperature source Temporal, resp. rate 16, height 5' 6" (1.676 m), weight 113 kg (249 lb), last menstrual period 2023, SpO2 96 %. Body mass index is 40.19 kg/m². Physical Exam  Constitutional:       Appearance: She is well-developed. HENT:      Head: Normocephalic and atraumatic. Eyes:      Pupils: Pupils are equal, round, and reactive to light. Cardiovascular:      Rate and Rhythm: Normal rate and regular rhythm. Heart sounds: Normal heart sounds. Pulmonary:      Effort: Pulmonary effort is normal. No respiratory distress. Breath sounds: Normal breath sounds. Abdominal:      General: Bowel sounds are normal. There is no distension. Palpations: Abdomen is soft. Abdomen is not rigid. There is mass. Tenderness: There is no abdominal tenderness. There is no guarding or rebound. Comments: Large abdominal mass measuring 30 x 20 cm filling up the entire upper abdomen. It is not palpable in the pelvis.    Genitourinary:     Comments: -Normal external female genitalia, normal Bartholin's and [place]'s glands                  -Normal midline urethral meatus. No lesions notes                  -Bladder without fullness mass or tenderness                  -Vagina without lesion or discharge No significant cystocele or rectocele noted                  -Cervix normal appearing without visible lesions                  -Uterus with normal contour, mobility. No tenderness,                  -Adnexae without  mass or tenderness                  - Anus without fissure of lesion    Musculoskeletal:         General: Normal range of motion. Cervical back: Normal range of motion and neck supple. Lymphadenopathy:      Cervical: No cervical adenopathy. Upper Body:      Right upper body: No supraclavicular adenopathy. Left upper body: No supraclavicular adenopathy. Skin:     General: Skin is warm and dry. Neurological:      Mental Status: She is alert and oriented to person, place, and time.    Psychiatric:         Behavior: Behavior normal.               No results found for: ""        Lab Results   Component Value Date     WBC 11.93 (H) 09/26/2023     HGB 11.7 09/26/2023     HCT 37.3 09/26/2023     MCV 82 09/26/2023      09/26/2023            Lab Results   Component Value Date     K 4.0 09/26/2023      09/26/2023     CO2 23 09/26/2023     BUN 11 09/26/2023     CREATININE 0.92 09/26/2023     CALCIUM 9.4 09/26/2023     AST 10 (L) 09/26/2023     ALT 11 09/26/2023     ALKPHOS 89 09/26/2023     EGFR 87 09/26/2023

## 2023-11-07 ENCOUNTER — HOSPITAL ENCOUNTER (INPATIENT)
Facility: HOSPITAL | Age: 23
LOS: 3 days | Discharge: HOME | DRG: 742 | End: 2023-11-10
Attending: OBSTETRICS & GYNECOLOGY | Admitting: OBSTETRICS & GYNECOLOGY
Payer: COMMERCIAL

## 2023-11-07 ENCOUNTER — ANESTHESIA (OUTPATIENT)
Dept: PERIOP | Facility: HOSPITAL | Age: 23
DRG: 742 | End: 2023-11-07
Payer: COMMERCIAL

## 2023-11-07 DIAGNOSIS — Z98.890 S/P EXPLORATORY LAPAROTOMY: ICD-10-CM

## 2023-11-07 DIAGNOSIS — N83.8 OVARIAN MASS: Primary | ICD-10-CM

## 2023-11-07 PROBLEM — Z01.818 PREOP TESTING: Status: ACTIVE | Noted: 2023-11-07

## 2023-11-07 PROBLEM — E66.9 OBESITY (BMI 30-39.9): Status: ACTIVE | Noted: 2023-11-07

## 2023-11-07 LAB
ABO GROUP BLD: NORMAL
BLD GP AB SCN SERPL QL: NEGATIVE
EXT PREGNANCY TEST URINE: NEGATIVE
EXT. CONTROL: NORMAL
RH BLD: POSITIVE
SPECIMEN EXPIRATION DATE: NORMAL

## 2023-11-07 PROCEDURE — 86901 BLOOD TYPING SEROLOGIC RH(D): CPT | Performed by: OBSTETRICS & GYNECOLOGY

## 2023-11-07 PROCEDURE — 86900 BLOOD TYPING SEROLOGIC ABO: CPT | Performed by: OBSTETRICS & GYNECOLOGY

## 2023-11-07 PROCEDURE — 0UT60ZZ RESECTION OF LEFT FALLOPIAN TUBE, OPEN APPROACH: ICD-10-PCS | Performed by: OBSTETRICS & GYNECOLOGY

## 2023-11-07 PROCEDURE — 88342 IMHCHEM/IMCYTCHM 1ST ANTB: CPT | Performed by: PATHOLOGY

## 2023-11-07 PROCEDURE — 81025 URINE PREGNANCY TEST: CPT | Performed by: OBSTETRICS & GYNECOLOGY

## 2023-11-07 PROCEDURE — 88112 CYTOPATH CELL ENHANCE TECH: CPT | Performed by: PATHOLOGY

## 2023-11-07 PROCEDURE — 88331 PATH CONSLTJ SURG 1 BLK 1SPC: CPT | Performed by: PATHOLOGY

## 2023-11-07 PROCEDURE — 86850 RBC ANTIBODY SCREEN: CPT | Performed by: OBSTETRICS & GYNECOLOGY

## 2023-11-07 PROCEDURE — 88341 IMHCHEM/IMCYTCHM EA ADD ANTB: CPT | Performed by: PATHOLOGY

## 2023-11-07 PROCEDURE — 88307 TISSUE EXAM BY PATHOLOGIST: CPT | Performed by: PATHOLOGY

## 2023-11-07 PROCEDURE — 88305 TISSUE EXAM BY PATHOLOGIST: CPT | Performed by: PATHOLOGY

## 2023-11-07 PROCEDURE — 58720 REMOVAL OF OVARY/TUBE(S): CPT | Performed by: OBSTETRICS & GYNECOLOGY

## 2023-11-07 PROCEDURE — 0UT10ZZ RESECTION OF LEFT OVARY, OPEN APPROACH: ICD-10-PCS | Performed by: OBSTETRICS & GYNECOLOGY

## 2023-11-07 PROCEDURE — C9290 INJ, BUPIVACAINE LIPOSOME: HCPCS | Performed by: ANESTHESIOLOGY

## 2023-11-07 RX ORDER — ACETAMINOPHEN 325 MG/1
975 TABLET ORAL ONCE
Status: COMPLETED | OUTPATIENT
Start: 2023-11-07 | End: 2023-11-07

## 2023-11-07 RX ORDER — DEXAMETHASONE SODIUM PHOSPHATE 10 MG/ML
INJECTION, SOLUTION INTRAMUSCULAR; INTRAVENOUS AS NEEDED
Status: DISCONTINUED | OUTPATIENT
Start: 2023-11-07 | End: 2023-11-07

## 2023-11-07 RX ORDER — HYDROMORPHONE HCL/PF 1 MG/ML
0.5 SYRINGE (ML) INJECTION
Status: DISCONTINUED | OUTPATIENT
Start: 2023-11-07 | End: 2023-11-07 | Stop reason: HOSPADM

## 2023-11-07 RX ORDER — DOCUSATE SODIUM 100 MG/1
100 CAPSULE, LIQUID FILLED ORAL 2 TIMES DAILY
Status: DISCONTINUED | OUTPATIENT
Start: 2023-11-07 | End: 2023-11-10 | Stop reason: HOSPADM

## 2023-11-07 RX ORDER — MIDAZOLAM HYDROCHLORIDE 2 MG/2ML
INJECTION, SOLUTION INTRAMUSCULAR; INTRAVENOUS AS NEEDED
Status: DISCONTINUED | OUTPATIENT
Start: 2023-11-07 | End: 2023-11-07

## 2023-11-07 RX ORDER — METOCLOPRAMIDE HYDROCHLORIDE 5 MG/ML
10 INJECTION INTRAMUSCULAR; INTRAVENOUS EVERY 6 HOURS PRN
Status: DISCONTINUED | OUTPATIENT
Start: 2023-11-07 | End: 2023-11-10 | Stop reason: HOSPADM

## 2023-11-07 RX ORDER — OXYCODONE HYDROCHLORIDE 5 MG/1
TABLET ORAL
Qty: 20 TABLET | Refills: 0 | Status: SHIPPED | OUTPATIENT
Start: 2023-11-07

## 2023-11-07 RX ORDER — KETOROLAC TROMETHAMINE 30 MG/ML
15 INJECTION, SOLUTION INTRAMUSCULAR; INTRAVENOUS EVERY 6 HOURS SCHEDULED
Status: COMPLETED | OUTPATIENT
Start: 2023-11-07 | End: 2023-11-08

## 2023-11-07 RX ORDER — LIDOCAINE HYDROCHLORIDE 20 MG/ML
INJECTION, SOLUTION EPIDURAL; INFILTRATION; INTRACAUDAL; PERINEURAL AS NEEDED
Status: DISCONTINUED | OUTPATIENT
Start: 2023-11-07 | End: 2023-11-07

## 2023-11-07 RX ORDER — FENTANYL CITRATE/PF 50 MCG/ML
50 SYRINGE (ML) INJECTION
Status: DISCONTINUED | OUTPATIENT
Start: 2023-11-07 | End: 2023-11-07 | Stop reason: HOSPADM

## 2023-11-07 RX ORDER — CELECOXIB 200 MG/1
200 CAPSULE ORAL ONCE
Status: COMPLETED | OUTPATIENT
Start: 2023-11-07 | End: 2023-11-07

## 2023-11-07 RX ORDER — SODIUM CHLORIDE, SODIUM LACTATE, POTASSIUM CHLORIDE, CALCIUM CHLORIDE 600; 310; 30; 20 MG/100ML; MG/100ML; MG/100ML; MG/100ML
INJECTION, SOLUTION INTRAVENOUS CONTINUOUS PRN
Status: DISCONTINUED | OUTPATIENT
Start: 2023-11-07 | End: 2023-11-07

## 2023-11-07 RX ORDER — PROPOFOL 10 MG/ML
INJECTION, EMULSION INTRAVENOUS AS NEEDED
Status: DISCONTINUED | OUTPATIENT
Start: 2023-11-07 | End: 2023-11-07

## 2023-11-07 RX ORDER — FENTANYL CITRATE 50 UG/ML
INJECTION, SOLUTION INTRAMUSCULAR; INTRAVENOUS AS NEEDED
Status: DISCONTINUED | OUTPATIENT
Start: 2023-11-07 | End: 2023-11-07

## 2023-11-07 RX ORDER — KETOROLAC TROMETHAMINE 30 MG/ML
INJECTION, SOLUTION INTRAMUSCULAR; INTRAVENOUS AS NEEDED
Status: DISCONTINUED | OUTPATIENT
Start: 2023-11-07 | End: 2023-11-07

## 2023-11-07 RX ORDER — BUPIVACAINE HYDROCHLORIDE 5 MG/ML
INJECTION, SOLUTION EPIDURAL; INTRACAUDAL AS NEEDED
Status: DISCONTINUED | OUTPATIENT
Start: 2023-11-07 | End: 2023-11-07

## 2023-11-07 RX ORDER — GABAPENTIN 100 MG/1
100 CAPSULE ORAL ONCE
Status: COMPLETED | OUTPATIENT
Start: 2023-11-07 | End: 2023-11-07

## 2023-11-07 RX ORDER — HEPARIN SODIUM 5000 [USP'U]/ML
5000 INJECTION, SOLUTION INTRAVENOUS; SUBCUTANEOUS
Status: COMPLETED | OUTPATIENT
Start: 2023-11-07 | End: 2023-11-07

## 2023-11-07 RX ORDER — OXYCODONE HYDROCHLORIDE 10 MG/1
10 TABLET ORAL EVERY 4 HOURS PRN
Status: DISCONTINUED | OUTPATIENT
Start: 2023-11-07 | End: 2023-11-10 | Stop reason: HOSPADM

## 2023-11-07 RX ORDER — ROCURONIUM BROMIDE 10 MG/ML
INJECTION, SOLUTION INTRAVENOUS AS NEEDED
Status: DISCONTINUED | OUTPATIENT
Start: 2023-11-07 | End: 2023-11-07

## 2023-11-07 RX ORDER — ONDANSETRON 2 MG/ML
INJECTION INTRAMUSCULAR; INTRAVENOUS AS NEEDED
Status: DISCONTINUED | OUTPATIENT
Start: 2023-11-07 | End: 2023-11-07

## 2023-11-07 RX ORDER — ACETAMINOPHEN 325 MG/1
650 TABLET ORAL EVERY 4 HOURS PRN
Status: DISCONTINUED | OUTPATIENT
Start: 2023-11-07 | End: 2023-11-09

## 2023-11-07 RX ORDER — OXYCODONE HYDROCHLORIDE 5 MG/1
5 TABLET ORAL EVERY 4 HOURS PRN
Status: DISCONTINUED | OUTPATIENT
Start: 2023-11-07 | End: 2023-11-10 | Stop reason: HOSPADM

## 2023-11-07 RX ORDER — CEFAZOLIN SODIUM 2 G/50ML
2000 SOLUTION INTRAVENOUS ONCE
Status: COMPLETED | OUTPATIENT
Start: 2023-11-07 | End: 2023-11-07

## 2023-11-07 RX ORDER — ONDANSETRON 2 MG/ML
4 INJECTION INTRAMUSCULAR; INTRAVENOUS EVERY 6 HOURS PRN
Status: DISCONTINUED | OUTPATIENT
Start: 2023-11-07 | End: 2023-11-10 | Stop reason: HOSPADM

## 2023-11-07 RX ORDER — SODIUM CHLORIDE 9 MG/ML
125 INJECTION, SOLUTION INTRAVENOUS CONTINUOUS
Status: DISCONTINUED | OUTPATIENT
Start: 2023-11-07 | End: 2023-11-08

## 2023-11-07 RX ORDER — ENOXAPARIN SODIUM 100 MG/ML
40 INJECTION SUBCUTANEOUS DAILY
Status: DISCONTINUED | OUTPATIENT
Start: 2023-11-08 | End: 2023-11-10 | Stop reason: HOSPADM

## 2023-11-07 RX ORDER — ONDANSETRON 2 MG/ML
4 INJECTION INTRAMUSCULAR; INTRAVENOUS ONCE AS NEEDED
Status: DISCONTINUED | OUTPATIENT
Start: 2023-11-07 | End: 2023-11-07 | Stop reason: HOSPADM

## 2023-11-07 RX ORDER — ACETAMINOPHEN 10 MG/ML
1000 INJECTION, SOLUTION INTRAVENOUS ONCE
Status: COMPLETED | OUTPATIENT
Start: 2023-11-07 | End: 2023-11-07

## 2023-11-07 RX ADMIN — DOCUSATE SODIUM 100 MG: 100 CAPSULE, LIQUID FILLED ORAL at 18:20

## 2023-11-07 RX ADMIN — ACETAMINOPHEN 1000 MG: 10 INJECTION INTRAVENOUS at 08:15

## 2023-11-07 RX ADMIN — ACETAMINOPHEN 325MG 975 MG: 325 TABLET ORAL at 05:41

## 2023-11-07 RX ADMIN — KETOROLAC TROMETHAMINE 15 MG: 30 INJECTION, SOLUTION INTRAMUSCULAR; INTRAVENOUS at 15:03

## 2023-11-07 RX ADMIN — FENTANYL CITRATE 50 MCG: 50 INJECTION INTRAMUSCULAR; INTRAVENOUS at 07:51

## 2023-11-07 RX ADMIN — SODIUM CHLORIDE, SODIUM LACTATE, POTASSIUM CHLORIDE, AND CALCIUM CHLORIDE: .6; .31; .03; .02 INJECTION, SOLUTION INTRAVENOUS at 07:38

## 2023-11-07 RX ADMIN — LIDOCAINE HYDROCHLORIDE 100 MG: 20 INJECTION, SOLUTION EPIDURAL; INFILTRATION; INTRACAUDAL at 07:33

## 2023-11-07 RX ADMIN — PROPOFOL 200 MG: 10 INJECTION, EMULSION INTRAVENOUS at 07:33

## 2023-11-07 RX ADMIN — PROPOFOL 100 MG: 10 INJECTION, EMULSION INTRAVENOUS at 09:07

## 2023-11-07 RX ADMIN — DOCUSATE SODIUM 100 MG: 100 CAPSULE, LIQUID FILLED ORAL at 10:44

## 2023-11-07 RX ADMIN — ROCURONIUM BROMIDE 50 MG: 10 INJECTION, SOLUTION INTRAVENOUS at 07:33

## 2023-11-07 RX ADMIN — FENTANYL CITRATE 50 MCG: 50 INJECTION, SOLUTION INTRAMUSCULAR; INTRAVENOUS at 09:56

## 2023-11-07 RX ADMIN — MIDAZOLAM 2 MG: 1 INJECTION INTRAMUSCULAR; INTRAVENOUS at 07:25

## 2023-11-07 RX ADMIN — KETOROLAC TROMETHAMINE 30 MG: 30 INJECTION, SOLUTION INTRAMUSCULAR at 08:41

## 2023-11-07 RX ADMIN — FENTANYL CITRATE 50 MCG: 50 INJECTION INTRAMUSCULAR; INTRAVENOUS at 08:11

## 2023-11-07 RX ADMIN — BUPIVACAINE HYDROCHLORIDE 20 ML: 5 INJECTION, SOLUTION EPIDURAL; INTRACAUDAL at 09:22

## 2023-11-07 RX ADMIN — BUPIVACAINE 20 ML: 13.3 INJECTION, SUSPENSION, LIPOSOMAL INFILTRATION at 09:22

## 2023-11-07 RX ADMIN — ONDANSETRON 4 MG: 2 INJECTION INTRAMUSCULAR; INTRAVENOUS at 08:41

## 2023-11-07 RX ADMIN — GABAPENTIN 100 MG: 100 CAPSULE ORAL at 05:41

## 2023-11-07 RX ADMIN — SUGAMMADEX 200 MG: 100 INJECTION, SOLUTION INTRAVENOUS at 09:19

## 2023-11-07 RX ADMIN — CELECOXIB 200 MG: 200 CAPSULE ORAL at 05:41

## 2023-11-07 RX ADMIN — FENTANYL CITRATE 100 MCG: 50 INJECTION INTRAMUSCULAR; INTRAVENOUS at 07:33

## 2023-11-07 RX ADMIN — SODIUM CHLORIDE 125 ML/HR: 0.9 INJECTION, SOLUTION INTRAVENOUS at 05:54

## 2023-11-07 RX ADMIN — CEFAZOLIN SODIUM 2000 MG: 2 SOLUTION INTRAVENOUS at 07:25

## 2023-11-07 RX ADMIN — HEPARIN SODIUM 5000 UNITS: 5000 INJECTION INTRAVENOUS; SUBCUTANEOUS at 05:41

## 2023-11-07 RX ADMIN — DEXAMETHASONE SODIUM PHOSPHATE 4 MG: 10 INJECTION INTRAMUSCULAR; INTRAVENOUS at 07:33

## 2023-11-07 NOTE — PROGRESS NOTES
Gyn Oncology Post Op Check   Alivia Tucker 24 y.o. female MRN: [id number]  Unit/Bed#: E5 -01 Encounter: [id number]      Subjective  POD#0 s/p Ex-Lap, LSO, TAPs block     Alivia reports that she is having some pain but that it is overall well controlled at this time. She has been able to tolerate liquids without issue and reports that she feels hungry and is looking forward to eating. /89 (BP Location: Left arm)   Pulse 68   Temp 97.6 °F (36.4 °C)   Resp 15   Ht 5' 6" (1.676 m)   Wt 107 kg (235 lb 10.8 oz)   LMP 10/23/2023 (Exact Date)   SpO2 98%   BMI 38.04 kg/m²     I/O last 3 completed shifts: In: 200 [I.V.:200]  Out: -   I/O this shift:  In: 1500 [I.V.:1500]  Out: 235 [Urine:185; Blood:50]    Lab Results   Component Value Date    WBC 9.90 10/04/2023    HGB 11.8 10/04/2023    HCT 39.3 10/04/2023    MCV 85 10/04/2023     10/04/2023       Lab Results   Component Value Date    CALCIUM 9.1 10/04/2023    K 4.2 10/04/2023    CO2 25 10/04/2023     10/04/2023    BUN 13 10/04/2023    CREATININE 0.96 10/04/2023       No results found for: "POCGLU"    Physical Exam  General: NAD in bed  Cardio: Regular rate   Lungs: No respiratory distress  Abdomen: Soft, mild tenderness to palpation, Vertical midline incision C/D/I with exofin skin adhesive in place   Extremities: Non-tende, SCDs in place    A/P: 24 y.o. female s/p Ex-lap, LSO POD#0     1. Frozen section with mucinous cystadenoma: f/u final pathology  2. Pain: s/p TAPs block, tylenol/toradol, nito PRN   3. FEN: Regular diet   4. DVT PPx: SCDs, Lovenox 40 mg daily to start POD#1  5. Encouraged incentive spirometry  6.  Encouraged ambulation as tolerated    Lalito Han MD  Obstetrics & Gynecology PGY-3  11/7/2023  2:15 PM

## 2023-11-07 NOTE — INTERVAL H&P NOTE
H&P reviewed. After examining the patient I find no changes in the patients condition since the H&P had been written. Plan left salpingo-oophorectomy possible modified staging possible ALISA/BSO for left ovarian mass. Preoperative testing is stable for surgery today.

## 2023-11-07 NOTE — ANESTHESIA POSTPROCEDURE EVALUATION
Post-Op Assessment Note    CV Status:  Stable  Pain Score: 1    Pain management: adequate     Mental Status:  Alert and awake   Hydration Status:  Euvolemic   PONV Controlled:  Controlled   Airway Patency:  Patent      Post Op Vitals Reviewed: Yes      Staff: Anesthesiologist         No notable events documented.     /84 (11/07/23 0952)    Temp      Pulse 80 (11/07/23 0952)   Resp 19 (11/07/23 0952)    SpO2 95 % (11/07/23 0952)

## 2023-11-07 NOTE — PLAN OF CARE
Problem: PAIN - ADULT  Goal: Verbalizes/displays adequate comfort level or baseline comfort level  Description: Interventions:  - Encourage patient to monitor pain and request assistance  - Assess pain using appropriate pain scale  - Administer analgesics based on type and severity of pain and evaluate response  - Implement non-pharmacological measures as appropriate and evaluate response  - Consider cultural and social influences on pain and pain management  - Notify physician/advanced practitioner if interventions unsuccessful or patient reports new pain  Outcome: Progressing     Problem: INFECTION - ADULT  Goal: Absence or prevention of progression during hospitalization  Description: INTERVENTIONS:  - Assess and monitor for signs and symptoms of infection  - Monitor lab/diagnostic results  - Monitor all insertion sites, i.e. indwelling lines, tubes, and drains  - Monitor endotracheal if appropriate and nasal secretions for changes in amount and color  - [place] appropriate cooling/warming therapies per order  - Administer medications as ordered  - Instruct and encourage patient and family to use good hand hygiene technique  - Identify and instruct in appropriate isolation precautions for identified infection/condition  Outcome: Progressing     Problem: DISCHARGE PLANNING  Goal: Discharge to home or other facility with appropriate resources  Description: INTERVENTIONS:  - Identify barriers to discharge w/patient and caregiver  - Arrange for needed discharge resources and transportation as appropriate  - Identify discharge learning needs (meds, wound care, etc.)  - Arrange for interpretive services to assist at discharge as needed  - Refer to Case Management Department for coordinating discharge planning if the patient needs post-hospital services based on physician/advanced practitioner order or complex needs related to functional status, cognitive ability, or social support system  Outcome: Progressing Problem: GASTROINTESTINAL - ADULT  Goal: Minimal or absence of nausea and/or vomiting  Description: INTERVENTIONS:  - Administer IV fluids if ordered to ensure adequate hydration  - Maintain NPO status until nausea and vomiting are resolved  - Nasogastric tube if ordered  - Administer ordered antiemetic medications as needed  - Provide nonpharmacologic comfort measures as appropriate  - Advance diet as tolerated, if ordered  - Consider nutrition services referral to assist patient with adequate nutrition and appropriate food choices  Outcome: Progressing     Problem: GENITOURINARY - ADULT  Goal: Maintains or returns to baseline urinary function  Description: INTERVENTIONS:  - Assess urinary function  - Encourage oral fluids to ensure adequate hydration if ordered  - Administer IV fluids as ordered to ensure adequate hydration  - Administer ordered medications as needed  - Offer frequent toileting  - Follow urinary retention protocol if ordered  Outcome: Progressing  Goal: Absence of urinary retention  Description: INTERVENTIONS:  - Assess patient’s ability to void and empty bladder  - Monitor I/O  - Bladder scan as needed  - Discuss with physician/AP medications to alleviate retention as needed  - Discuss catheterization for long term situations as appropriate  Outcome: Progressing  Goal: Urinary catheter remains patent  Description: INTERVENTIONS:  - Assess patency of urinary catheter  - If patient has a chronic flores, consider changing catheter if non-functioning  - Follow guidelines for intermittent irrigation of non-functioning urinary catheter  Outcome: Progressing

## 2023-11-07 NOTE — DISCHARGE SUMMARY
Discharge Summary - Gynecologic Oncology  Alivia Robbins 24 y.o. female MRN: [id number]  Unit/Bed#: E5 -01 Encounter: [id number]    Admission Date: 11/7/2023   Discharge Date: 11/10/23    Attending Physician: Erik Amin MD       Admitting Diagnosis:   Ovarian mass [N83.8]    Discharge Diagnosis:   S/p Exploratory Laparotomy, Left Salping-oophorectomy     Procedures Performed: Exploratory Laparotomy, Left Salping-oophorectomy, TAPs block    Hospital Course: The patient presented on day of admission for the above mentioned procedure. She received a TAPs block intra-op for pain control. She was admitted for pain control and postoperative management. Frozen section was consistent with mucinous cystadenoma. Her procedure was uncomplicated. On POD#1, the patient was doing well. Her pain was well controlled. Her Hb fell from 11.8 preoperatively to 9.6 postoperatively. Her Flores was removed. She did not pass her void trial and the flores was replaced. She was tolerating PO and passing flatus. On POD#3, flores was removed and she was able to void spontaneously. The patient was discharged home on POD#3 in stable condition. She was asked to follow up with Dr. Erik Amin in 2 weeks for a postoperative appointment. Lab Results:   Lab Results   Component Value Date    WBC 10.76 (H) 11/10/2023    HGB 10.0 (L) 11/10/2023    HCT 32.1 (L) 11/10/2023    MCV 82 11/10/2023     11/10/2023     Lab Results   Component Value Date    CALCIUM 8.7 11/10/2023    K 3.5 11/10/2023    CO2 27 11/10/2023     11/10/2023    BUN 6 11/10/2023    CREATININE 0.67 11/10/2023     No results found for: "POCGLU"  No results found for: "PTT"  No results found for: "INR", "PROTIME"    Condition at Discharge: good     Discharge Medications: See after visit summary for reconciled discharge medications provided to patient and family.       Discharge instructions/Information to patient and family: See after visit summary for information provided to patient and family. Provisions for Follow-Up Care: See after visit summary for information related to follow-up care and any pertinent home health orders. Disposition: See After Visit Summary for discharge disposition information.     Planned Readmission: No    Franci Hawthorne MD  Obstetrics & Gynecology PGY-3

## 2023-11-07 NOTE — OP NOTE
OPERATIVE REPORT  PATIENT NAME: Paola Solorzano    :  2000  MRN: [id number]  Pt Location: [facility] OR ROOM 05    SURGERY DATE: 2023    Surgeon(s) and Role:     * Tatiana Dent MD - Primary     * Thor Angel MD - Assisting    Preop Diagnosis:  Ovarian mass [N83.8]    Post-Op Diagnosis Codes:     * Ovarian mass [N83.8]    Procedure(s):  LAPAROTOMY EXPLORATORY  Left - SALPINGO-OOPHORECTOMY    Specimen(s):  ID Type Source Tests Collected by Time Destination   1 : left fallopian tube and ovary Tissue Ovary, Left TISSUE EXAM Tatiana Dent MD 2023 [id number]    2 : Pelvic washings Washing Pelvic Washing NON-GYNECOLOGIC CYTOLOGY Tatiana Dent MD 2023 [id number]    3 : Left Abdominal Wall Biopsy Tissue Abdominal TISSUE EXAM Tatiana Dent MD 2023 [id number]        Estimated Blood Loss:   Minimal    Drains:  Urethral Catheter Non-latex 16 Fr. (Active)   Number of days: 0       Anesthesia Type:   General w/ Epidural    Operative Indications:  Ovarian mass [N83.8]      Operative Findings: There is a large 20 x 30 cm, 22 pound left ovarian mass. There were no excrescences. The uterus right tube and ovary were unremarkable. All peritoneal surfaces were unremarkable including appendix liver edge diaphragm and all small bowel as well as omentum. The mass was removed without rupture. Frozen section pathology report indicated mucinous cystadenoma. Complications:   None    Procedure and Technique:    After informed consent was obtained, the patient was taken to the operating room where general endotracheal anesthesia was then administered without incident. A full time out procedure was performed. The patient was prepped and draped in normal sterile fashion in the supine position. The patient was froglegged and a chlorhexidine vaginal prep was performed then a Tao catheter was inserted.  A midline vertical incision was created with a knife and carried through to the fascia with a Bovie electrocautery device. This was taken down to the underlying layer of fascia with cautery. The fascia was opened the midline. The fascial incision extended superiorly and anteriorly. The mass was easily identified. It was rolled out of the incision. The left pelvic sidewall was opened skeletonizing the IP ligament. The ureter was identified. The IP ligament was doubly crossclamped with curved Zeppelin clamps. The utero-ovarian ligament was clamped in the same fashion. The pedicles were cut. The specimen was removed. The pedicles were free tied with 0 Vicryl suture and then suture-ligated with 0 Vicryl suture in a fixation stitch fashion. The abdomen was explored with the aforementioned findings. Washings were taken from the pelvis. A single bleeder in the omentum was tied with 0 Vicryl suture. Frozen section indicated benign disease and no further surgery was required. The abdomen was irrigated. The fascia was closed using #1 looped PDS in a running Smead-Fernandez fashion. The subcutaneous tissue was closed with 2-0 Vicryl suture. The skin was closed with stratafix and histocryl. A sterile dressing was applied. The patient was then awakened and transferred to the recovery room in stable condition. All instrument and instrument counts were correct X 2 for the procedure. No complications were noted. I was present for the entire procedure. I was present for the entire procedure.     Patient Disposition:  PACU     This procedure was not performed to treat colon cancer through resection      SIGNATURE: Flo Delgado MD  DATE: November 7, 2023  TIME: 9:29 AM

## 2023-11-07 NOTE — ANESTHESIA PROCEDURE NOTES
Peripheral Block    Patient location during procedure: OR  Start time: 11/7/2023 9:13 AM  Reason for block: at surgeon's request and post-op pain management  Staffing  Performed by: Olga Lidia Carrillo DO  Authorized by: Olga Lidia Carrillo DO    Preanesthetic Checklist  Completed: patient identified, IV checked, site marked, risks and benefits discussed, surgical consent, monitors and equipment checked, pre-op evaluation and timeout performed  Peripheral Block  Patient position: supine  Prep: ChloraPrep  Patient monitoring: continuous pulse oximetry, frequent blood pressure checks and heart rate  Block type: TAP  Laterality: bilateral  Injection technique: single-shot  Procedures: ultrasound guided, Ultrasound guidance required for the procedure to increase accuracy and safety of medication placement and decrease risk of complications.   Ultrasound permanent image saved  Needle  Needle type: Stimuplex   Needle gauge: 20 G  Needle length: 4 in  Needle localization: ultrasound guidance  Assessment  Injection assessment: frequent aspiration and no paresthesia on injection  Paresthesia pain: none  Post-procedure:  site cleaned  patient tolerated the procedure well with no immediate complications

## 2023-11-08 PROBLEM — Z98.890 S/P EXPLORATORY LAPAROTOMY: Status: ACTIVE | Noted: 2023-11-08

## 2023-11-08 PROBLEM — Z90.721 S/P LEFT OOPHORECTOMY: Status: ACTIVE | Noted: 2023-11-08

## 2023-11-08 LAB
ANION GAP SERPL CALCULATED.3IONS-SCNC: 5 MMOL/L
BUN SERPL-MCNC: 8 MG/DL (ref 5–25)
CALCIUM SERPL-MCNC: 8.4 MG/DL (ref 8.4–10.2)
CHLORIDE SERPL-SCNC: 109 MMOL/L (ref 96–108)
CO2 SERPL-SCNC: 25 MMOL/L (ref 21–32)
CREAT SERPL-MCNC: 0.69 MG/DL (ref 0.6–1.3)
ERYTHROCYTE [DISTWIDTH] IN BLOOD BY AUTOMATED COUNT: 14.8 % (ref 11.6–15.1)
GFR SERPL CREATININE-BSD FRML MDRD: 122 ML/MIN/1.73SQ M
GLUCOSE SERPL-MCNC: 97 MG/DL (ref 65–140)
HCT VFR BLD AUTO: 31.4 % (ref 34.8–46.1)
HGB BLD-MCNC: 9.6 G/DL (ref 11.5–15.4)
MAGNESIUM SERPL-MCNC: 2 MG/DL (ref 1.9–2.7)
MCH RBC QN AUTO: 25.4 PG (ref 26.8–34.3)
MCHC RBC AUTO-ENTMCNC: 30.6 G/DL (ref 31.4–37.4)
MCV RBC AUTO: 83 FL (ref 82–98)
PHOSPHATE SERPL-MCNC: 3.4 MG/DL (ref 2.7–4.5)
PLATELET # BLD AUTO: 291 THOUSANDS/UL (ref 149–390)
PMV BLD AUTO: 10.3 FL (ref 8.9–12.7)
POTASSIUM SERPL-SCNC: 3.9 MMOL/L (ref 3.5–5.3)
RBC # BLD AUTO: 3.78 MILLION/UL (ref 3.81–5.12)
SODIUM SERPL-SCNC: 139 MMOL/L (ref 135–147)
WBC # BLD AUTO: 13.08 THOUSAND/UL (ref 4.31–10.16)

## 2023-11-08 PROCEDURE — 84100 ASSAY OF PHOSPHORUS: CPT

## 2023-11-08 PROCEDURE — 85027 COMPLETE CBC AUTOMATED: CPT

## 2023-11-08 PROCEDURE — 99024 POSTOP FOLLOW-UP VISIT: CPT | Performed by: OBSTETRICS & GYNECOLOGY

## 2023-11-08 PROCEDURE — 80048 BASIC METABOLIC PNL TOTAL CA: CPT

## 2023-11-08 PROCEDURE — 83735 ASSAY OF MAGNESIUM: CPT

## 2023-11-08 RX ORDER — IBUPROFEN 600 MG/1
600 TABLET ORAL EVERY 6 HOURS PRN
Status: DISCONTINUED | OUTPATIENT
Start: 2023-11-08 | End: 2023-11-08

## 2023-11-08 RX ORDER — IBUPROFEN 600 MG/1
600 TABLET ORAL EVERY 6 HOURS PRN
Status: DISCONTINUED | OUTPATIENT
Start: 2023-11-08 | End: 2023-11-09

## 2023-11-08 RX ADMIN — SODIUM CHLORIDE 125 ML/HR: 0.9 INJECTION, SOLUTION INTRAVENOUS at 06:51

## 2023-11-08 RX ADMIN — ACETAMINOPHEN 325MG 650 MG: 325 TABLET ORAL at 10:49

## 2023-11-08 RX ADMIN — DOCUSATE SODIUM 100 MG: 100 CAPSULE, LIQUID FILLED ORAL at 17:04

## 2023-11-08 RX ADMIN — KETOROLAC TROMETHAMINE 15 MG: 30 INJECTION, SOLUTION INTRAMUSCULAR; INTRAVENOUS at 11:26

## 2023-11-08 RX ADMIN — KETOROLAC TROMETHAMINE 15 MG: 30 INJECTION, SOLUTION INTRAMUSCULAR; INTRAVENOUS at 00:06

## 2023-11-08 RX ADMIN — DOCUSATE SODIUM 100 MG: 100 CAPSULE, LIQUID FILLED ORAL at 08:25

## 2023-11-08 RX ADMIN — OXYCODONE HYDROCHLORIDE 5 MG: 5 TABLET ORAL at 00:07

## 2023-11-08 RX ADMIN — ENOXAPARIN SODIUM 40 MG: 100 INJECTION SUBCUTANEOUS at 08:25

## 2023-11-08 RX ADMIN — ACETAMINOPHEN 325MG 650 MG: 325 TABLET ORAL at 13:55

## 2023-11-08 RX ADMIN — IBUPROFEN 600 MG: 600 TABLET, FILM COATED ORAL at 13:55

## 2023-11-08 RX ADMIN — KETOROLAC TROMETHAMINE 15 MG: 30 INJECTION, SOLUTION INTRAMUSCULAR; INTRAVENOUS at 06:40

## 2023-11-08 NOTE — ASSESSMENT & PLAN NOTE
Hgb 11.8 -> 9.6 -> 9.6 -> 10.0  Pain: s/p tap block, IV tylenol/ toradol, nito 5/10  Tao:  removed 11/8 and then replaced due to failed void trial, plan for repeat void trial today   DVT Ppx: lovenox  Regular diet

## 2023-11-08 NOTE — PLAN OF CARE
Problem: PAIN - ADULT  Goal: Verbalizes/displays adequate comfort level or baseline comfort level  Description: Interventions:  - Encourage patient to monitor pain and request assistance  - Assess pain using appropriate pain scale  - Administer analgesics based on type and severity of pain and evaluate response  - Implement non-pharmacological measures as appropriate and evaluate response  - Consider cultural and social influences on pain and pain management  - Notify physician/advanced practitioner if interventions unsuccessful or patient reports new pain  Outcome: Progressing     Problem: INFECTION - ADULT  Goal: Absence or prevention of progression during hospitalization  Description: INTERVENTIONS:  - Assess and monitor for signs and symptoms of infection  - Monitor lab/diagnostic results  - Monitor all insertion sites, i.e. indwelling lines, tubes, and drains  - Monitor endotracheal if appropriate and nasal secretions for changes in amount and color  - [place] appropriate cooling/warming therapies per order  - Administer medications as ordered  - Instruct and encourage patient and family to use good hand hygiene technique  - Identify and instruct in appropriate isolation precautions for identified infection/condition  Outcome: Progressing     Problem: DISCHARGE PLANNING  Goal: Discharge to home or other facility with appropriate resources  Description: INTERVENTIONS:  - Identify barriers to discharge w/patient and caregiver  - Arrange for needed discharge resources and transportation as appropriate  - Identify discharge learning needs (meds, wound care, etc.)  - Arrange for interpretive services to assist at discharge as needed  - Refer to Case Management Department for coordinating discharge planning if the patient needs post-hospital services based on physician/advanced practitioner order or complex needs related to functional status, cognitive ability, or social support system  Outcome: Progressing Problem: GASTROINTESTINAL - ADULT  Goal: Minimal or absence of nausea and/or vomiting  Description: INTERVENTIONS:  - Administer IV fluids if ordered to ensure adequate hydration  - Maintain NPO status until nausea and vomiting are resolved  - Nasogastric tube if ordered  - Administer ordered antiemetic medications as needed  - Provide nonpharmacologic comfort measures as appropriate  - Advance diet as tolerated, if ordered  - Consider nutrition services referral to assist patient with adequate nutrition and appropriate food choices  Outcome: Progressing     Problem: GENITOURINARY - ADULT  Goal: Maintains or returns to baseline urinary function  Description: INTERVENTIONS:  - Assess urinary function  - Encourage oral fluids to ensure adequate hydration if ordered  - Administer IV fluids as ordered to ensure adequate hydration  - Administer ordered medications as needed  - Offer frequent toileting  - Follow urinary retention protocol if ordered  Outcome: Progressing  Goal: Absence of urinary retention  Description: INTERVENTIONS:  - Assess patient’s ability to void and empty bladder  - Monitor I/O  - Bladder scan as needed  - Discuss with physician/AP medications to alleviate retention as needed  - Discuss catheterization for long term situations as appropriate  Outcome: Progressing  Goal: Urinary catheter remains patent  Description: INTERVENTIONS:  - Assess patency of urinary catheter  - If patient has a chronic flores, consider changing catheter if non-functioning  - Follow guidelines for intermittent irrigation of non-functioning urinary catheter  Outcome: Progressing

## 2023-11-08 NOTE — PROGRESS NOTES
For questions/concerns on this patient, please reach out to the followin James Rosas Resident   Gyn Oncology Progress note   Laura Leigh 24 y.o. female MRN: [id number]  Unit/Bed#: E5 -01 Encounter: [id number]    Assessment/Plan:    24 y.o. POD# 1 from ex-lap and LSO. Recovering well and stable. S/P left oophorectomy  Assessment & Plan  Pathology benign    * S/P exploratory laparotomy  Assessment & Plan  Hgb 11.8 -> 9.6  Pain: s/p tap block, tylenol, toradol, nito 5/10, will transition toradol to motrin today  DVT Ppx: lovenox  Regular diet         Subjective:    Laura Leigh has no current complaints. Pain is well controlled. Overnight events: none. Patient is not currently voiding, flores being removed this morning. She is ambulating. Patient is not currently passing flatus and has had no bowel movement. She is tolerating PO, and denies nausea or vomiting. Patient denies fever, chills, chest pain, shortness of breath, or calf tenderness. Objective:  /60   Pulse 65   Temp 97.6 °F (36.4 °C)   Resp 18   Ht 5' 6" (1.676 m)   Wt 107 kg (235 lb 10.8 oz)   LMP 10/23/2023 (Exact Date)   SpO2 98%   BMI 38.04 kg/m²     I/O last 3 completed shifts: In: 1700 [I.V.:1700]  Out: 985 [Urine:935; Blood:50]  No intake/output data recorded. Lab Results   Component Value Date    WBC 13.08 (H) 2023    HGB 9.6 (L) 2023    HCT 31.4 (L) 2023    MCV 83 2023     2023       Lab Results   Component Value Date    CALCIUM 9.1 10/04/2023    K 4.2 10/04/2023    CO2 25 10/04/2023     10/04/2023    BUN 13 10/04/2023    CREATININE 0.96 10/04/2023           Physical Exam  Vitals reviewed. Constitutional:       General: She is not in acute distress. HENT:      Head: Normocephalic and atraumatic. Eyes:      Extraocular Movements: Extraocular movements intact. Cardiovascular:      Rate and Rhythm: Normal rate and regular rhythm. Pulses: Normal pulses. Pulmonary:      Effort: Pulmonary effort is normal. No respiratory distress. Abdominal:      General: There is no distension. Palpations: Abdomen is soft. Tenderness: There is no abdominal tenderness. Comments: Midline vertical c/d/i   Musculoskeletal:         General: Normal range of motion. Cervical back: Normal range of motion. Skin:     General: Skin is warm and dry. Neurological:      General: No focal deficit present. Mental Status: She is alert. Mental status is at baseline.    Psychiatric:         Mood and Affect: Mood normal.         Behavior: Behavior normal.           Desi Hutton MD  11/8/2023  6:26 AM

## 2023-11-08 NOTE — UTILIZATION REVIEW
Initial Clinical Review    Elective IP surgical procedure  Age/Sex: 24 y.o. female  Surgery Date: 11/7/23 @ 0752  Procedure: LAPAROTOMY EXPLORATORY  Left - SALPINGO-OOPHORECTOMY  Anesthesia:general w/ epidural, Exparel TAP blocchk  Operative Findings: There is a large 20 x 30 cm, 22 pound left ovarian mass. There were no excrescences. The uterus right tube and ovary were unremarkable. All peritoneal surfaces were unremarkable including appendix liver edge diaphragm and all small bowel as well as omentum. Minimal EBL. POD#1 Progress Note: 11/8/23  L oophorectomy- Pathology benign Hgb down to 9.6 from 1.8 preop. Pain controlled, receiving scheduled IV toradol, prn Oxycodone , s/p TAP block. . Plan to transition to po Ibuprofen from Iv Toradol later today and monitor pain . Tao d/c'ed this am, has not voided yet . Not passing flatus, no bm yet. Pt is denisse po intake w/o N/V . IVF infusing. Midline vertical  incision c/d/i  . Abdomen soft, non distended. , DVT ppx- Lovenox to start today . Encourage ambulation as denisse. Spirometry . Labs in am- CBC, BMP, mag, phos . Admission Orders: Date/Time/Statement:   Admission Orders (From admission, onward)       Ordered        11/07/23 0915  Inpatient Admission  Once                          Orders Placed This Encounter   Procedures    Inpatient Admission     Standing Status:   Standing     Number of Occurrences:   1     Order Specific Question:   Level of Care     Answer:   Med Surg [16]     Order Specific Question:   Estimated length of stay     Answer:   More than 2 Midnights     Order Specific Question:   Certification     Answer:   I certify that inpatient services are medically necessary for this patient for a duration of greater than two midnights. See H&P and MD Progress Notes for additional information about the patient's course of treatment.      Vital Signs:   Date/Time Temp Pulse Resp BP MAP (mmHg) SpO2 Calculated FIO2 (%) - Nasal Cannula Nasal Cannula O2 Flow Rate (L/min) O2 Device   11/08/23 0924 -- -- -- -- -- 96 % -- -- None (Room air)   11/08/23 0712 98.7 °F (37.1 °C) 73 18 112/71 85 98 % 24 1 L/min Nasal cannula   11/08/23 0406 97.6 °F (36.4 °C) 65 18 110/60 77 98 % -- -- --   11/08/23 0021 97.8 °F (36.6 °C) 67 16 126/78 94 -- -- -- --   11/08/23 0006 -- -- -- -- -- -- 28 2 L/min Nasal cannula   11/07/23 1930 97.2 °F (36.2 °C) Abnormal  73 16 126/75 92 98 % 28 2 L/min Nasal cannula   11/07/23 1600 97.7 °F (36.5 °C) 70 14 128/82 97 98 % 28 2 L/min Nasal cannula   11/07/23 1041 -- 68 15 135/89 -- 98 % 28 2 L/min Nasal cannula   11/07/23 1007 97.6 °F (36.4 °C) 70 14 134/88 105 98 % 28 2 L/min Nasal cannula   11/07/23 1004 -- -- -- -- -- -- 28 2 L/min Nasal cannula   11/07/23 0952 -- 80 19 136/84 105 95 % -- -- None (Room air)   11/07/23 0937 98.2 °F (36.8 °C) 100 19 135/84 105 95 % -- -- None (Room air)         Pertinent Labs/Diagnostic Test Results:   RADIOLOGY RESULTS   Final Result by  (11/08 0920)            Results from last 7 days   Lab Units 11/08/23  0530   WBC Thousand/uL 13.08*   HEMOGLOBIN g/dL 9.6*   HEMATOCRIT % 31.4*   PLATELETS Thousands/uL 291         Results from last 7 days   Lab Units 11/08/23  0530   SODIUM mmol/L 139   POTASSIUM mmol/L 3.9   CHLORIDE mmol/L 109*   CO2 mmol/L 25   ANION GAP mmol/L 5   BUN mg/dL 8   CREATININE mg/dL 0.69   EGFR ml/min/1.73sq m 122   CALCIUM mg/dL 8.4   MAGNESIUM mg/dL 2.0   PHOSPHORUS mg/dL 3.4             Results from last 7 days   Lab Units 11/08/23  0530   GLUCOSE RANDOM mg/dL 97                               Diet: reg  Mobility: Up as denisse  DVT Prophylaxis:  ambulation,SCD's, Lovenox     Medications/Pain Control:   Scheduled Medications:  docusate sodium, 100 mg, Oral, BID  enoxaparin, 40 mg, Subcutaneous, Daily  ketorolac, 15 mg, Intravenous, Q6H DEBRA    bupivacaine liposomal (EXPAREL) 1.3 % injection 20 mL  Dose: 20 mL  Freq:  Once Route: INFILTRATION  Indications of Use: LOCAL ANESTHESIA  Start: 11/07/23 8931 End: 11/07/23 0922   Continuous IV Infusions:  sodium chloride, 125 mL/hr, Intravenous, Continuous      PRN Meds:  acetaminophen, 650 mg, Oral, Q4H PRN  ibuprofen, 600 mg, Oral, Q6H PRN  metoclopramide, 10 mg, Intravenous, Q6H PRN  ondansetron, 4 mg, Intravenous, Q6H PRN  oxyCODONE, 10 mg, Oral, Q4H PRN  oxyCODONE, 5 mg, Oral, Q4H PRN x1 11/8  fentaNYL (SUBLIMAZE) injection 50 mcg  Dose: 50 mcg  Freq: Every 5 minutes PRN Route: IV  PRN Reason: Pain - PACU  PRN Comment: Breakthrough - first line  Start: 11/07/23 0944 End: 11/07/23 1031  x1 11/7       Network Utilization Review Department  ATTENTION: Please call with any questions or concerns to [phone] and carefully listen to the prompts so that you are directed to the right person. All voicemails are confidential.   For Discharge needs, contact Care Management DC Support Team at [phone] opt. 2  Send all requests for admission clinical reviews, approved or denied determinations and any other requests to dedicated fax number below belonging to the campus where the patient is receiving treatment.  List of dedicated fax numbers for the Facilities:  [place] DENIALS (Administrative/Medical Necessity) [phone]   DISCHARGE SUPPORT TEAM (NETWORK) [address] [facility] (Maternity/NICU/Pediatrics) [phone]   [address] [facility] [phone]   [address] [facility] [address] [facility] [address] [facility] [address] [address] [facility] [address] [facility] [address] [facility] [phone]

## 2023-11-08 NOTE — PLAN OF CARE
Problem: PAIN - ADULT  Goal: Verbalizes/displays adequate comfort level or baseline comfort level  Description: Interventions:  - Encourage patient to monitor pain and request assistance  - Assess pain using appropriate pain scale  - Administer analgesics based on type and severity of pain and evaluate response  - Implement non-pharmacological measures as appropriate and evaluate response  - Consider cultural and social influences on pain and pain management  - Notify physician/advanced practitioner if interventions unsuccessful or patient reports new pain  Outcome: Progressing     Problem: INFECTION - ADULT  Goal: Absence or prevention of progression during hospitalization  Description: INTERVENTIONS:  - Assess and monitor for signs and symptoms of infection  - Monitor lab/diagnostic results  - Monitor all insertion sites, i.e. indwelling lines, tubes, and drains  - Monitor endotracheal if appropriate and nasal secretions for changes in amount and color  - [place] appropriate cooling/warming therapies per order  - Administer medications as ordered  - Instruct and encourage patient and family to use good hand hygiene technique  - Identify and instruct in appropriate isolation precautions for identified infection/condition  Outcome: Progressing     Problem: DISCHARGE PLANNING  Goal: Discharge to home or other facility with appropriate resources  Description: INTERVENTIONS:  - Identify barriers to discharge w/patient and caregiver  - Arrange for needed discharge resources and transportation as appropriate  - Identify discharge learning needs (meds, wound care, etc.)  - Arrange for interpretive services to assist at discharge as needed  - Refer to Case Management Department for coordinating discharge planning if the patient needs post-hospital services based on physician/advanced practitioner order or complex needs related to functional status, cognitive ability, or social support system  Outcome: Progressing Problem: GASTROINTESTINAL - ADULT  Goal: Minimal or absence of nausea and/or vomiting  Description: INTERVENTIONS:  - Administer IV fluids if ordered to ensure adequate hydration  - Maintain NPO status until nausea and vomiting are resolved  - Nasogastric tube if ordered  - Administer ordered antiemetic medications as needed  - Provide nonpharmacologic comfort measures as appropriate  - Advance diet as tolerated, if ordered  - Consider nutrition services referral to assist patient with adequate nutrition and appropriate food choices  Outcome: Progressing     Problem: GENITOURINARY - ADULT  Goal: Maintains or returns to baseline urinary function  Description: INTERVENTIONS:  - Assess urinary function  - Encourage oral fluids to ensure adequate hydration if ordered  - Administer IV fluids as ordered to ensure adequate hydration  - Administer ordered medications as needed  - Offer frequent toileting  - Follow urinary retention protocol if ordered  Outcome: Progressing  Goal: Absence of urinary retention  Description: INTERVENTIONS:  - Assess patient’s ability to void and empty bladder  - Monitor I/O  - Bladder scan as needed  - Discuss with physician/AP medications to alleviate retention as needed  - Discuss catheterization for long term situations as appropriate  Outcome: Progressing  Goal: Urinary catheter remains patent  Description: INTERVENTIONS:  - Assess patency of urinary catheter  - If patient has a chronic flores, consider changing catheter if non-functioning  - Follow guidelines for intermittent irrigation of non-functioning urinary catheter  Outcome: Progressing

## 2023-11-09 LAB
ANION GAP SERPL CALCULATED.3IONS-SCNC: 5 MMOL/L
BUN SERPL-MCNC: 7 MG/DL (ref 5–25)
CALCIUM SERPL-MCNC: 8.5 MG/DL (ref 8.4–10.2)
CHLORIDE SERPL-SCNC: 108 MMOL/L (ref 96–108)
CO2 SERPL-SCNC: 26 MMOL/L (ref 21–32)
CREAT SERPL-MCNC: 0.66 MG/DL (ref 0.6–1.3)
ERYTHROCYTE [DISTWIDTH] IN BLOOD BY AUTOMATED COUNT: 15.2 % (ref 11.6–15.1)
GFR SERPL CREATININE-BSD FRML MDRD: 124 ML/MIN/1.73SQ M
GLUCOSE SERPL-MCNC: 87 MG/DL (ref 65–140)
HCT VFR BLD AUTO: 31.7 % (ref 34.8–46.1)
HGB BLD-MCNC: 9.6 G/DL (ref 11.5–15.4)
MAGNESIUM SERPL-MCNC: 1.8 MG/DL (ref 1.9–2.7)
MCH RBC QN AUTO: 25.3 PG (ref 26.8–34.3)
MCHC RBC AUTO-ENTMCNC: 30.3 G/DL (ref 31.4–37.4)
MCV RBC AUTO: 83 FL (ref 82–98)
PHOSPHATE SERPL-MCNC: 3.1 MG/DL (ref 2.7–4.5)
PLATELET # BLD AUTO: 261 THOUSANDS/UL (ref 149–390)
PMV BLD AUTO: 10.6 FL (ref 8.9–12.7)
POTASSIUM SERPL-SCNC: 4.1 MMOL/L (ref 3.5–5.3)
RBC # BLD AUTO: 3.8 MILLION/UL (ref 3.81–5.12)
SODIUM SERPL-SCNC: 139 MMOL/L (ref 135–147)
WBC # BLD AUTO: 10.24 THOUSAND/UL (ref 4.31–10.16)

## 2023-11-09 PROCEDURE — 83735 ASSAY OF MAGNESIUM: CPT

## 2023-11-09 PROCEDURE — 80048 BASIC METABOLIC PNL TOTAL CA: CPT

## 2023-11-09 PROCEDURE — 84100 ASSAY OF PHOSPHORUS: CPT

## 2023-11-09 PROCEDURE — 99024 POSTOP FOLLOW-UP VISIT: CPT | Performed by: OBSTETRICS & GYNECOLOGY

## 2023-11-09 PROCEDURE — 85027 COMPLETE CBC AUTOMATED: CPT

## 2023-11-09 RX ORDER — ACETAMINOPHEN 10 MG/ML
1000 INJECTION, SOLUTION INTRAVENOUS EVERY 8 HOURS
Status: DISCONTINUED | OUTPATIENT
Start: 2023-11-09 | End: 2023-11-10

## 2023-11-09 RX ORDER — MAGNESIUM SULFATE HEPTAHYDRATE 40 MG/ML
4 INJECTION, SOLUTION INTRAVENOUS ONCE
Status: COMPLETED | OUTPATIENT
Start: 2023-11-09 | End: 2023-11-09

## 2023-11-09 RX ORDER — METHYLPHENIDATE HYDROCHLORIDE 27 MG/1
27 TABLET, EXTENDED RELEASE ORAL DAILY
Status: DISCONTINUED | OUTPATIENT
Start: 2023-11-09 | End: 2023-11-10 | Stop reason: HOSPADM

## 2023-11-09 RX ORDER — KETOROLAC TROMETHAMINE 30 MG/ML
30 INJECTION, SOLUTION INTRAMUSCULAR; INTRAVENOUS EVERY 8 HOURS
Status: DISCONTINUED | OUTPATIENT
Start: 2023-11-09 | End: 2023-11-10

## 2023-11-09 RX ORDER — CITALOPRAM 20 MG/1
40 TABLET ORAL DAILY
Status: DISCONTINUED | OUTPATIENT
Start: 2023-11-09 | End: 2023-11-10 | Stop reason: HOSPADM

## 2023-11-09 RX ORDER — ACETAMINOPHEN 10 MG/ML
1000 INJECTION, SOLUTION INTRAVENOUS
Status: CANCELLED | OUTPATIENT
Start: 2023-11-09

## 2023-11-09 RX ADMIN — OXYCODONE HYDROCHLORIDE 10 MG: 10 TABLET ORAL at 08:10

## 2023-11-09 RX ADMIN — KETOROLAC TROMETHAMINE 30 MG: 30 INJECTION, SOLUTION INTRAMUSCULAR; INTRAVENOUS at 14:18

## 2023-11-09 RX ADMIN — METHYLPHENIDATE HYDROCHLORIDE 27 MG: 27 TABLET ORAL at 18:06

## 2023-11-09 RX ADMIN — CITALOPRAM HYDROBROMIDE 40 MG: 20 TABLET ORAL at 14:18

## 2023-11-09 RX ADMIN — ENOXAPARIN SODIUM 40 MG: 100 INJECTION SUBCUTANEOUS at 08:46

## 2023-11-09 RX ADMIN — ACETAMINOPHEN 325MG 650 MG: 325 TABLET ORAL at 00:20

## 2023-11-09 RX ADMIN — KETOROLAC TROMETHAMINE 30 MG: 30 INJECTION, SOLUTION INTRAMUSCULAR; INTRAVENOUS at 23:58

## 2023-11-09 RX ADMIN — DOCUSATE SODIUM 100 MG: 100 CAPSULE, LIQUID FILLED ORAL at 17:33

## 2023-11-09 RX ADMIN — ACETAMINOPHEN 1000 MG: 10 INJECTION INTRAVENOUS at 17:33

## 2023-11-09 RX ADMIN — MAGNESIUM SULFATE IN WATER 4 G: 40 INJECTION, SOLUTION INTRAVENOUS at 10:57

## 2023-11-09 RX ADMIN — ACETAMINOPHEN 1000 MG: 10 INJECTION INTRAVENOUS at 08:44

## 2023-11-09 RX ADMIN — ACETAMINOPHEN 1000 MG: 10 INJECTION INTRAVENOUS at 23:57

## 2023-11-09 RX ADMIN — DOCUSATE SODIUM 100 MG: 100 CAPSULE, LIQUID FILLED ORAL at 08:46

## 2023-11-09 NOTE — PROGRESS NOTES
For questions/concerns on this patient, please reach out to the followin James Rosas Resident   Gyn Oncology Progress note   Rena Orozco 24 y.o. female MRN: [id number]  Unit/Bed#: E5 -01 Encounter: [id number]    Assessment/Plan:  24 y.o. POD# 2 from ex-lap and LSO. Recovering well and stable. S/P left oophorectomy  Assessment & Plan  Intra-op frozen section benign, follow up final pathology     * S/P exploratory laparotomy  Assessment & Plan  Hgb 11.8 -> 9.6 -> 9.6   Pain: s/p tap block, tylenol/ motrin, nito 5/10  Flores:  removed  and then replaced due to failed void trial, UOP 1.4 cc/kg/hr, consider repeat void trial today  DVT Ppx: lovenox  Regular diet               Subjective:    Rena Orozco reports continued abdominal pain with movement. Patient is currently voiding via flores. She is ambulating. Patient is currently passing flatus and has had no bowel movement. She is tolerating PO, and denies nausea or vomiting. Patient denies fever, chills, chest pain, shortness of breath, or calf tenderness. Objective:  /82 (BP Location: Left arm)   Pulse 88   Temp 97.8 °F (36.6 °C) (Oral)   Resp 19   Ht 5' 6" (1.676 m)   Wt 107 kg (235 lb 10.8 oz)   LMP 10/23/2023 (Exact Date)   SpO2 99%   BMI 38.04 kg/m²     I/O last 3 completed shifts: In: 1500 [I.V.:1500]  Out:  [Urine:; Blood:50]  I/O this shift:  In: -   Out: 1227 [Urine:1375]    Lab Results   Component Value Date    WBC 10.24 (H) 2023    HGB 9.6 (L) 2023    HCT 31.7 (L) 2023    MCV 83 2023     2023       Lab Results   Component Value Date    CALCIUM 8.4 2023    K 3.9 2023    CO2 25 2023     (H) 2023    BUN 8 2023    CREATININE 0.69 2023           Physical Exam  Constitutional:       General: She is not in acute distress. Appearance: Normal appearance. HENT:      Head: Normocephalic and atraumatic.    Cardiovascular:      Rate and Rhythm: Normal rate. Pulmonary:      Effort: Pulmonary effort is normal.   Abdominal:      General: There is distension. Palpations: Abdomen is soft. Tenderness: There is no guarding or rebound. Comments: Vertical midline incision C/D/I   Mild distension and tenderness throughout    Musculoskeletal:      Right lower leg: No tenderness. Left lower leg: No tenderness. Skin:     General: Skin is warm and dry. Neurological:      General: No focal deficit present. Mental Status: She is alert.    Psychiatric:         Mood and Affect: Mood normal.           Mouna Hernandez MD  11/9/2023  6:52 AM

## 2023-11-09 NOTE — PLAN OF CARE
Problem: PAIN - ADULT  Goal: Verbalizes/displays adequate comfort level or baseline comfort level  Description: Interventions:  - Encourage patient to monitor pain and request assistance  - Assess pain using appropriate pain scale  - Administer analgesics based on type and severity of pain and evaluate response  - Implement non-pharmacological measures as appropriate and evaluate response  - Consider cultural and social influences on pain and pain management  - Notify physician/advanced practitioner if interventions unsuccessful or patient reports new pain  Outcome: Progressing     Problem: INFECTION - ADULT  Goal: Absence or prevention of progression during hospitalization  Description: INTERVENTIONS:  - Assess and monitor for signs and symptoms of infection  - Monitor lab/diagnostic results  - Monitor all insertion sites, i.e. indwelling lines, tubes, and drains  - Monitor endotracheal if appropriate and nasal secretions for changes in amount and color  - [place] appropriate cooling/warming therapies per order  - Administer medications as ordered  - Instruct and encourage patient and family to use good hand hygiene technique  - Identify and instruct in appropriate isolation precautions for identified infection/condition  Outcome: Progressing     Problem: DISCHARGE PLANNING  Goal: Discharge to home or other facility with appropriate resources  Description: INTERVENTIONS:  - Identify barriers to discharge w/patient and caregiver  - Arrange for needed discharge resources and transportation as appropriate  - Identify discharge learning needs (meds, wound care, etc.)  - Arrange for interpretive services to assist at discharge as needed  - Refer to Case Management Department for coordinating discharge planning if the patient needs post-hospital services based on physician/advanced practitioner order or complex needs related to functional status, cognitive ability, or social support system  Outcome: Progressing Problem: GASTROINTESTINAL - ADULT  Goal: Minimal or absence of nausea and/or vomiting  Description: INTERVENTIONS:  - Administer IV fluids if ordered to ensure adequate hydration  - Maintain NPO status until nausea and vomiting are resolved  - Nasogastric tube if ordered  - Administer ordered antiemetic medications as needed  - Provide nonpharmacologic comfort measures as appropriate  - Advance diet as tolerated, if ordered  - Consider nutrition services referral to assist patient with adequate nutrition and appropriate food choices  Outcome: Progressing     Problem: GENITOURINARY - ADULT  Goal: Maintains or returns to baseline urinary function  Description: INTERVENTIONS:  - Assess urinary function  - Encourage oral fluids to ensure adequate hydration if ordered  - Administer IV fluids as ordered to ensure adequate hydration  - Administer ordered medications as needed  - Offer frequent toileting  - Follow urinary retention protocol if ordered  Outcome: Progressing  Goal: Absence of urinary retention  Description: INTERVENTIONS:  - Assess patient’s ability to void and empty bladder  - Monitor I/O  - Bladder scan as needed  - Discuss with physician/AP medications to alleviate retention as needed  - Discuss catheterization for long term situations as appropriate  Outcome: Progressing  Goal: Urinary catheter remains patent  Description: INTERVENTIONS:  - Assess patency of urinary catheter  - If patient has a chronic flores, consider changing catheter if non-functioning  - Follow guidelines for intermittent irrigation of non-functioning urinary catheter  Outcome: Progressing     Problem: SKIN/TISSUE INTEGRITY - ADULT  Goal: Incision(s), wounds(s) or drain site(s) healing without S/S of infection  Description: INTERVENTIONS  - Assess and document dressing, incision, wound bed, drain sites and surrounding tissue  - Provide patient and family education  - Perform skin care/dressing changes every shift    Outcome: Progressing

## 2023-11-09 NOTE — PLAN OF CARE
Problem: PAIN - ADULT  Goal: Verbalizes/displays adequate comfort level or baseline comfort level  Description: Interventions:  - Encourage patient to monitor pain and request assistance  - Assess pain using appropriate pain scale  - Administer analgesics based on type and severity of pain and evaluate response  - Implement non-pharmacological measures as appropriate and evaluate response  - Consider cultural and social influences on pain and pain management  - Notify physician/advanced practitioner if interventions unsuccessful or patient reports new pain  Outcome: Progressing     Problem: INFECTION - ADULT  Goal: Absence or prevention of progression during hospitalization  Description: INTERVENTIONS:  - Assess and monitor for signs and symptoms of infection  - Monitor lab/diagnostic results  - Monitor all insertion sites, i.e. indwelling lines, tubes, and drains  - Monitor endotracheal if appropriate and nasal secretions for changes in amount and color  - [place] appropriate cooling/warming therapies per order  - Administer medications as ordered  - Instruct and encourage patient and family to use good hand hygiene technique  - Identify and instruct in appropriate isolation precautions for identified infection/condition  Outcome: Progressing     Problem: DISCHARGE PLANNING  Goal: Discharge to home or other facility with appropriate resources  Description: INTERVENTIONS:  - Identify barriers to discharge w/patient and caregiver  - Arrange for needed discharge resources and transportation as appropriate  - Identify discharge learning needs (meds, wound care, etc.)  - Arrange for interpretive services to assist at discharge as needed  - Refer to Case Management Department for coordinating discharge planning if the patient needs post-hospital services based on physician/advanced practitioner order or complex needs related to functional status, cognitive ability, or social support system  Outcome: Progressing Problem: GASTROINTESTINAL - ADULT  Goal: Minimal or absence of nausea and/or vomiting  Description: INTERVENTIONS:  - Administer IV fluids if ordered to ensure adequate hydration  - Maintain NPO status until nausea and vomiting are resolved  - Nasogastric tube if ordered  - Administer ordered antiemetic medications as needed  - Provide nonpharmacologic comfort measures as appropriate  - Advance diet as tolerated, if ordered  - Consider nutrition services referral to assist patient with adequate nutrition and appropriate food choices  Outcome: Progressing     Problem: GENITOURINARY - ADULT  Goal: Maintains or returns to baseline urinary function  Description: INTERVENTIONS:  - Assess urinary function  - Encourage oral fluids to ensure adequate hydration if ordered  - Administer IV fluids as ordered to ensure adequate hydration  - Administer ordered medications as needed  - Offer frequent toileting  - Follow urinary retention protocol if ordered  Outcome: Progressing  Goal: Absence of urinary retention  Description: INTERVENTIONS:  - Assess patient’s ability to void and empty bladder  - Monitor I/O  - Bladder scan as needed  - Discuss with physician/AP medications to alleviate retention as needed  - Discuss catheterization for long term situations as appropriate  Outcome: Progressing  Goal: Urinary catheter remains patent  Description: INTERVENTIONS:  - Assess patency of urinary catheter  - If patient has a chronic flores, consider changing catheter if non-functioning  - Follow guidelines for intermittent irrigation of non-functioning urinary catheter  Outcome: Progressing

## 2023-11-10 VITALS
SYSTOLIC BLOOD PRESSURE: 126 MMHG | TEMPERATURE: 98.5 F | BODY MASS INDEX: 37.88 KG/M2 | RESPIRATION RATE: 18 BRPM | OXYGEN SATURATION: 98 % | HEART RATE: 91 BPM | DIASTOLIC BLOOD PRESSURE: 81 MMHG | HEIGHT: 66 IN | WEIGHT: 235.67 LBS

## 2023-11-10 PROBLEM — F90.9 ADHD: Status: ACTIVE | Noted: 2023-11-10

## 2023-11-10 LAB
ANION GAP SERPL CALCULATED.3IONS-SCNC: 4 MMOL/L
BUN SERPL-MCNC: 6 MG/DL (ref 5–25)
CALCIUM SERPL-MCNC: 8.7 MG/DL (ref 8.4–10.2)
CHLORIDE SERPL-SCNC: 105 MMOL/L (ref 96–108)
CO2 SERPL-SCNC: 27 MMOL/L (ref 21–32)
CREAT SERPL-MCNC: 0.67 MG/DL (ref 0.6–1.3)
ERYTHROCYTE [DISTWIDTH] IN BLOOD BY AUTOMATED COUNT: 14.6 % (ref 11.6–15.1)
GFR SERPL CREATININE-BSD FRML MDRD: 123 ML/MIN/1.73SQ M
GLUCOSE SERPL-MCNC: 94 MG/DL (ref 65–140)
HCT VFR BLD AUTO: 32.1 % (ref 34.8–46.1)
HGB BLD-MCNC: 10 G/DL (ref 11.5–15.4)
MAGNESIUM SERPL-MCNC: 2.1 MG/DL (ref 1.9–2.7)
MCH RBC QN AUTO: 25.4 PG (ref 26.8–34.3)
MCHC RBC AUTO-ENTMCNC: 31.2 G/DL (ref 31.4–37.4)
MCV RBC AUTO: 82 FL (ref 82–98)
PLATELET # BLD AUTO: 289 THOUSANDS/UL (ref 149–390)
PMV BLD AUTO: 10.1 FL (ref 8.9–12.7)
POTASSIUM SERPL-SCNC: 3.5 MMOL/L (ref 3.5–5.3)
RBC # BLD AUTO: 3.93 MILLION/UL (ref 3.81–5.12)
SODIUM SERPL-SCNC: 136 MMOL/L (ref 135–147)
WBC # BLD AUTO: 10.76 THOUSAND/UL (ref 4.31–10.16)

## 2023-11-10 PROCEDURE — 85027 COMPLETE CBC AUTOMATED: CPT

## 2023-11-10 PROCEDURE — 83735 ASSAY OF MAGNESIUM: CPT

## 2023-11-10 PROCEDURE — 99024 POSTOP FOLLOW-UP VISIT: CPT | Performed by: OBSTETRICS & GYNECOLOGY

## 2023-11-10 PROCEDURE — 80048 BASIC METABOLIC PNL TOTAL CA: CPT

## 2023-11-10 RX ORDER — DOCUSATE SODIUM 100 MG/1
100 CAPSULE, LIQUID FILLED ORAL 2 TIMES DAILY
Refills: 0
Start: 2023-11-10

## 2023-11-10 RX ORDER — IBUPROFEN 600 MG/1
600 TABLET ORAL EVERY 6 HOURS SCHEDULED
Status: DISCONTINUED | OUTPATIENT
Start: 2023-11-10 | End: 2023-11-10 | Stop reason: HOSPADM

## 2023-11-10 RX ORDER — ACETAMINOPHEN 325 MG/1
975 TABLET ORAL EVERY 8 HOURS SCHEDULED
Refills: 0
Start: 2023-11-10

## 2023-11-10 RX ORDER — ACETAMINOPHEN 325 MG/1
975 TABLET ORAL EVERY 8 HOURS SCHEDULED
Status: DISCONTINUED | OUTPATIENT
Start: 2023-11-10 | End: 2023-11-10 | Stop reason: HOSPADM

## 2023-11-10 RX ORDER — IBUPROFEN 600 MG/1
600 TABLET ORAL EVERY 6 HOURS SCHEDULED
Qty: 30 TABLET | Refills: 0 | Status: SHIPPED | OUTPATIENT
Start: 2023-11-10 | End: 2023-12-10

## 2023-11-10 RX ORDER — METHYLPHENIDATE HYDROCHLORIDE 27 MG/1
1 TABLET, EXTENDED RELEASE ORAL DAILY
Status: DISCONTINUED | OUTPATIENT
Start: 2023-11-11 | End: 2023-11-10 | Stop reason: CLARIF

## 2023-11-10 RX ADMIN — METHYLPHENIDATE HYDROCHLORIDE 27 MG: 27 TABLET ORAL at 09:26

## 2023-11-10 RX ADMIN — CITALOPRAM HYDROBROMIDE 40 MG: 20 TABLET ORAL at 09:26

## 2023-11-10 RX ADMIN — ENOXAPARIN SODIUM 40 MG: 100 INJECTION SUBCUTANEOUS at 09:25

## 2023-11-10 RX ADMIN — ACETAMINOPHEN 325MG 975 MG: 325 TABLET ORAL at 09:27

## 2023-11-10 RX ADMIN — DOCUSATE SODIUM 100 MG: 100 CAPSULE, LIQUID FILLED ORAL at 09:26

## 2023-11-10 NOTE — PROGRESS NOTES
For questions/concerns on this patient, please reach out to the followinClair Ramirez Dr Resident   Gyn Oncology Progress note   Anjali Israel 24 y.o. female MRN: [id number]  Unit/Bed#: E5 -01 Encounter: [id number]    Assessment/Plan:  24 y.o. POD# 3 from ex-lap and LSO. Recovering well and stable. ADHD  Assessment & Plan  Home concerta  ordered    S/P left oophorectomy  Assessment & Plan  Intra-op frozen section benign, follow up final pathology     [address] [facility] reordered    * S/P exploratory laparotomy  Assessment & Plan  Hgb 11.8 -> 9.6 -> 9.6 -> 10.0  Pain: s/p tap block, IV tylenol/ toradol, nito 5/10  Tao:  removed  and then replaced due to failed void trial, plan for repeat void trial today   DVT Ppx: lovenox  Regular diet               Subjective:    Anjali Israel has no current complaints. Pain is well controlled. Patient is currently voiding. She is ambulating. Patient is currently passing flatus and has had bowel movement. She is tolerating PO, and denies nausea or vomiting. Patient denies fever, chills, chest pain, shortness of breath, or calf tenderness. Objective:  /81 (BP Location: Left arm)   Pulse 91   Temp 98.5 °F (36.9 °C) (Oral)   Resp 18   Ht 5' 6" (1.676 m)   Wt 107 kg (235 lb 10.8 oz)   LMP 10/23/2023 (Exact Date)   SpO2 98%   BMI 38.04 kg/m²     I/O last 3 completed shifts:  In: -   Out: 0355 [Urine:3275]  No intake/output data recorded. Lab Results   Component Value Date    WBC 10.76 (H) 11/10/2023    HGB 10.0 (L) 11/10/2023    HCT 32.1 (L) 11/10/2023    MCV 82 11/10/2023     11/10/2023       Lab Results   Component Value Date    CALCIUM 8.5 2023    K 4.1 2023    CO2 26 2023     2023    BUN 7 2023    CREATININE 0.66 2023           Physical Exam  Constitutional:       General: She is not in acute distress. Appearance: Normal appearance.    HENT:      Head: Normocephalic and atraumatic. Cardiovascular:      Rate and Rhythm: Normal rate. Pulmonary:      Effort: Pulmonary effort is normal.   Abdominal:      Palpations: Abdomen is soft. Tenderness: There is no abdominal tenderness. Comments: Vertical midline incision C/D/I    Skin:     General: Skin is warm and dry. Neurological:      General: No focal deficit present. Mental Status: She is alert.    Psychiatric:         Mood and Affect: Mood normal.           Wendy Quesada MD  11/10/2023  7:06 AM

## 2023-11-10 NOTE — PLAN OF CARE
Problem: PAIN - ADULT  Goal: Verbalizes/displays adequate comfort level or baseline comfort level  Description: Interventions:  - Encourage patient to monitor pain and request assistance  - Assess pain using appropriate pain scale  - Administer analgesics based on type and severity of pain and evaluate response  - Implement non-pharmacological measures as appropriate and evaluate response  - Consider cultural and social influences on pain and pain management  - Notify physician/advanced practitioner if interventions unsuccessful or patient reports new pain  Outcome: Progressing     Problem: INFECTION - ADULT  Goal: Absence or prevention of progression during hospitalization  Description: INTERVENTIONS:  - Assess and monitor for signs and symptoms of infection  - Monitor lab/diagnostic results  - Monitor all insertion sites, i.e. indwelling lines, tubes, and drains  - Monitor endotracheal if appropriate and nasal secretions for changes in amount and color  - [place] appropriate cooling/warming therapies per order  - Administer medications as ordered  - Instruct and encourage patient and family to use good hand hygiene technique  - Identify and instruct in appropriate isolation precautions for identified infection/condition  Outcome: Progressing     Problem: DISCHARGE PLANNING  Goal: Discharge to home or other facility with appropriate resources  Description: INTERVENTIONS:  - Identify barriers to discharge w/patient and caregiver  - Arrange for needed discharge resources and transportation as appropriate  - Identify discharge learning needs (meds, wound care, etc.)  - Arrange for interpretive services to assist at discharge as needed  - Refer to Case Management Department for coordinating discharge planning if the patient needs post-hospital services based on physician/advanced practitioner order or complex needs related to functional status, cognitive ability, or social support system  Outcome: Progressing Problem: GASTROINTESTINAL - ADULT  Goal: Minimal or absence of nausea and/or vomiting  Description: INTERVENTIONS:  - Administer IV fluids if ordered to ensure adequate hydration  - Maintain NPO status until nausea and vomiting are resolved  - Nasogastric tube if ordered  - Administer ordered antiemetic medications as needed  - Provide nonpharmacologic comfort measures as appropriate  - Advance diet as tolerated, if ordered  - Consider nutrition services referral to assist patient with adequate nutrition and appropriate food choices  Outcome: Progressing     Problem: GENITOURINARY - ADULT  Goal: Maintains or returns to baseline urinary function  Description: INTERVENTIONS:  - Assess urinary function  - Encourage oral fluids to ensure adequate hydration if ordered  - Administer IV fluids as ordered to ensure adequate hydration  - Administer ordered medications as needed  - Offer frequent toileting  - Follow urinary retention protocol if ordered  Outcome: Progressing  Goal: Absence of urinary retention  Description: INTERVENTIONS:  - Assess patient’s ability to void and empty bladder  - Monitor I/O  - Bladder scan as needed  - Discuss with physician/AP medications to alleviate retention as needed  - Discuss catheterization for long term situations as appropriate  Outcome: Progressing  Goal: Urinary catheter remains patent  Description: INTERVENTIONS:  - Assess patency of urinary catheter  - If patient has a chronic flores, consider changing catheter if non-functioning  - Follow guidelines for intermittent irrigation of non-functioning urinary catheter  Outcome: Progressing     Problem: SKIN/TISSUE INTEGRITY - ADULT  Goal: Incision(s), wounds(s) or drain site(s) healing without S/S of infection  Description: INTERVENTIONS  - Assess and document dressing, incision, wound bed, drain sites and surrounding tissue  - Provide patient and family education  - Perform skin care/dressing changes every shift    Outcome: Progressing

## 2023-11-10 NOTE — PLAN OF CARE
Problem: PAIN - ADULT  Goal: Verbalizes/displays adequate comfort level or baseline comfort level  Description: Interventions:  - Encourage patient to monitor pain and request assistance  - Assess pain using appropriate pain scale  - Administer analgesics based on type and severity of pain and evaluate response  - Implement non-pharmacological measures as appropriate and evaluate response  - Consider cultural and social influences on pain and pain management  - Notify physician/advanced practitioner if interventions unsuccessful or patient reports new pain  Outcome: Progressing     Problem: INFECTION - ADULT  Goal: Absence or prevention of progression during hospitalization  Description: INTERVENTIONS:  - Assess and monitor for signs and symptoms of infection  - Monitor lab/diagnostic results  - Monitor all insertion sites, i.e. indwelling lines, tubes, and drains  - Monitor endotracheal if appropriate and nasal secretions for changes in amount and color  - [place] appropriate cooling/warming therapies per order  - Administer medications as ordered  - Instruct and encourage patient and family to use good hand hygiene technique  - Identify and instruct in appropriate isolation precautions for identified infection/condition  Outcome: Progressing     Problem: DISCHARGE PLANNING  Goal: Discharge to home or other facility with appropriate resources  Description: INTERVENTIONS:  - Identify barriers to discharge w/patient and caregiver  - Arrange for needed discharge resources and transportation as appropriate  - Identify discharge learning needs (meds, wound care, etc.)  - Arrange for interpretive services to assist at discharge as needed  - Refer to Case Management Department for coordinating discharge planning if the patient needs post-hospital services based on physician/advanced practitioner order or complex needs related to functional status, cognitive ability, or social support system  Outcome: Progressing Problem: GASTROINTESTINAL - ADULT  Goal: Minimal or absence of nausea and/or vomiting  Description: INTERVENTIONS:  - Administer IV fluids if ordered to ensure adequate hydration  - Maintain NPO status until nausea and vomiting are resolved  - Nasogastric tube if ordered  - Administer ordered antiemetic medications as needed  - Provide nonpharmacologic comfort measures as appropriate  - Advance diet as tolerated, if ordered  - Consider nutrition services referral to assist patient with adequate nutrition and appropriate food choices  Outcome: Progressing     Problem: GENITOURINARY - ADULT  Goal: Maintains or returns to baseline urinary function  Description: INTERVENTIONS:  - Assess urinary function  - Encourage oral fluids to ensure adequate hydration if ordered  - Administer IV fluids as ordered to ensure adequate hydration  - Administer ordered medications as needed  - Offer frequent toileting  - Follow urinary retention protocol if ordered  Outcome: Progressing  Goal: Absence of urinary retention  Description: INTERVENTIONS:  - Assess patient’s ability to void and empty bladder  - Monitor I/O  - Bladder scan as needed  - Discuss with physician/AP medications to alleviate retention as needed  - Discuss catheterization for long term situations as appropriate  Outcome: Progressing  Goal: Urinary catheter remains patent  Description: INTERVENTIONS:  - Assess patency of urinary catheter  - If patient has a chronic flores, consider changing catheter if non-functioning  - Follow guidelines for intermittent irrigation of non-functioning urinary catheter  Outcome: Progressing     Problem: SKIN/TISSUE INTEGRITY - ADULT  Goal: Incision(s), wounds(s) or drain site(s) healing without S/S of infection  Description: INTERVENTIONS  - Assess and document dressing, incision, wound bed, drain sites and surrounding tissue  - Provide patient and family education  - Perform skin care/dressing changes every shift    Outcome: Progressing

## 2023-11-13 NOTE — UTILIZATION REVIEW
NOTIFICATION OF ADMISSION DISCHARGE   This is a Notification of Discharge from [address] [facility]. Please be advised that this patient has been discharge from our facility. Below you will find the admission and discharge date and time including the patient’s disposition. UTILIZATION REVIEW CONTACT:  Sandrine Patel MA  Utilization   Network Utilization Review Department  Phone: [phone] x carefully listen to the prompts. All voicemails are confidential.  Email: [email or web address]     ADMISSION INFORMATION  PRESENTATION DATE: 11/7/2023  5:01 AM  OBERVATION ADMISSION DATE:   INPATIENT ADMISSION DATE: 11/7/23  9:15 AM   DISCHARGE DATE: 11/10/2023  1:23 PM   DISPOSITION:Home/Self Care    Network Utilization Review Department  ATTENTION: Please call with any questions or concerns to [phone] and carefully listen to the prompts so that you are directed to the right person. All voicemails are confidential.   For Discharge needs, contact Care Management DC Support Team at [phone] opt. 2  Send all requests for admission clinical reviews, approved or denied determinations and any other requests to dedicated fax number below belonging to the campus where the patient is receiving treatment.  List of dedicated fax numbers for the Facilities:  [place] DENIALS (Administrative/Medical Necessity) [phone]   DISCHARGE SUPPORT TEAM (Network) [phone] [facility] [phone]   [address] [facility] [address] [place] [address] [address]  [facility] [phone]

## 2023-11-14 PROCEDURE — NC001 PR NO CHARGE: Performed by: OBSTETRICS & GYNECOLOGY

## 2023-11-16 PROCEDURE — 88342 IMHCHEM/IMCYTCHM 1ST ANTB: CPT | Performed by: PATHOLOGY

## 2023-11-16 PROCEDURE — 88305 TISSUE EXAM BY PATHOLOGIST: CPT | Performed by: PATHOLOGY

## 2023-11-16 PROCEDURE — 88307 TISSUE EXAM BY PATHOLOGIST: CPT | Performed by: PATHOLOGY

## 2023-11-16 PROCEDURE — 88341 IMHCHEM/IMCYTCHM EA ADD ANTB: CPT | Performed by: PATHOLOGY

## 2023-11-16 PROCEDURE — 88112 CYTOPATH CELL ENHANCE TECH: CPT | Performed by: PATHOLOGY

## 2023-11-17 ENCOUNTER — DOCUMENTATION (OUTPATIENT)
Dept: HEMATOLOGY ONCOLOGY | Facility: CLINIC | Age: 23
End: 2023-11-17

## 2023-11-17 PROBLEM — C56.9 OVARIAN CANCER (HCC): Status: ACTIVE | Noted: 2023-11-17

## 2023-11-17 NOTE — PROGRESS NOTES
In-basket message received from Dr. Derrick Turner to add patient to the [facility] on 11/27/2023. Chart reviewed and prep completed.

## 2023-11-21 ENCOUNTER — OFFICE VISIT (OUTPATIENT)
Dept: GYNECOLOGIC ONCOLOGY | Facility: CLINIC | Age: 23
End: 2023-11-21
Payer: COMMERCIAL

## 2023-11-21 VITALS
TEMPERATURE: 98.3 F | DIASTOLIC BLOOD PRESSURE: 86 MMHG | SYSTOLIC BLOOD PRESSURE: 130 MMHG | WEIGHT: 210 LBS | OXYGEN SATURATION: 98 % | HEART RATE: 128 BPM | BODY MASS INDEX: 33.89 KG/M2

## 2023-11-21 DIAGNOSIS — C56.2 MALIGNANT NEOPLASM OF LEFT OVARY (HCC): Primary | ICD-10-CM

## 2023-11-21 PROCEDURE — 99213 OFFICE O/P EST LOW 20 MIN: CPT | Performed by: OBSTETRICS & GYNECOLOGY

## 2023-11-21 NOTE — PROGRESS NOTES
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 We discussed treatment options and have recommended observation at this time. We have recommended no chemotherapy. We have stated that recurrence rates are in the low single digit percentage range however we should see her every 3 to 6 months for the next 5 years and annually thereafter. Additionally  and CEA can be drawn. Patient is comfortable with this plan. She is also reported she is planning to start oral contraceptive pills after New Year's. I discussed that this may reduce risks of further malignancy. We discussed that no genetic analysis is required at this time. Relevant Orders        CEA         CHIEF COMPLAINT: Treatment planning newly diagnosed ovarian cancer      Problem:  Cancer Staging   Ovarian cancer [facility])  Staging form: Ovary, Fallopian Tube, Primary Peritoneal, AJCC 8th Edition  - Pathologic stage from 11/17/2023: FIGO Stage IA, calculated as Stage Unknown (pT1a, pNX, cM0) - Signed by Tg Salazar MD on 11/17/2023        Previous therapy:  Oncology History   Ovarian cancer ([address])   11/17/2023 Initial Diagnosis    Ovarian cancer ([address])     11/17/2023 -  Cancer Staged    Staging form: Ovary, Fallopian Tube, Primary Peritoneal, AJCC 8th Edition  - Pathologic stage from 11/17/2023: FIGO Stage IA, calculated as Stage Unknown (pT1a, pNX, cM0) - Signed by Tg Salazar MD on 11/17/2023  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   Final pathology report reveals borderline ovarian tumor. Contralateral ovary and uterus were unremarkable. Recommend observation. Patient ID: Kike Webb is a 24 y.o. female  Patient is a very pleasant 77-year-old female with a history of a large 30 cm adnexal mass. She underwent left salpingo-oophorectomy via open procedure. Mass was removed intact. Patient tolerated her procedure well. She had no postoperative complications. She presents today in follow-up. Final pathology report reveals the following:  Final Diagnosis  A. Left fallopian tube and ovary, left salpingo-oophorectomy:  -Mucinous borderline tumor, 37.5 cm in greatest dimension, see comment.   -Fallopian tube with no significant histopathologic change.  -See synoptic report. Comment: Cytologic and architectural atypia is present on permanent sections of extensively sampled ovary. Immunoprofile is supportive of primary ovarian mucinous neoplasm. Recommend clinical and radiologic correlation to exclude the possibility of mucinous neoplasm arising from extra-ovarian source. B. Left abdominal wall, biopsy:  -Fibroadipose tissue with fat necrosis and histiocytic reaction. Today, the patient is doing well. She denies significant abdominal pain, pelvic pain, nausea, vomiting, constipation, diarrhea, fevers, chills, or vaginal bleeding. The following portions of the patient's history were reviewed and updated as appropriate: allergies, current medications, past family history, past medical history, past social history, past surgical history, and problem list.    Review of Systems   Constitutional: Negative. HENT: Negative. Eyes: Negative. Respiratory: Negative. Cardiovascular: Negative. Gastrointestinal: Negative. Endocrine: Negative. Genitourinary: Negative. Musculoskeletal: Negative. Skin: Negative. Neurological: Negative. Hematological: Negative. Psychiatric/Behavioral: Negative.          Current Outpatient Medications   Medication Sig Dispense Refill    acetaminophen (TYLENOL) 325 mg tablet Take 3 tablets (975 mg total) by mouth every 8 (eight) hours  0    citalopram (CeleXA) 40 mg tablet Take 40 mg by mouth daily      docusate sodium (COLACE) 100 mg capsule Take 1 capsule (100 mg total) by mouth 2 (two) times a day  0    ibuprofen (MOTRIN) 600 mg tablet Take 1 tablet (600 mg total) by mouth every 6 (six) hours 30 tablet 0    Methylphenidate HCl ER 27 MG TB24 Take 27 mg by mouth daily      oxyCODONE (ROXICODONE) 5 immediate release tablet 1 tablet by mouth every 4-6 hour as needed 20 tablet 0     No current facility-administered medications for this visit. Objective:    Blood pressure 130/86, pulse (!) 128, temperature 98.3 °F (36.8 °C), temperature source Temporal, weight 95.3 kg (210 lb), last menstrual period 10/23/2023, SpO2 98 %. Body mass index is 33.89 kg/m². Body surface area is 2.04 meters squared. Physical Exam  Constitutional:       Appearance: She is well-developed. HENT:      Head: Normocephalic and atraumatic. Eyes:      Pupils: Pupils are equal, round, and reactive to light. Cardiovascular:      Rate and Rhythm: Normal rate and regular rhythm. Heart sounds: Normal heart sounds. Pulmonary:      Effort: Pulmonary effort is normal. No respiratory distress. Breath sounds: Normal breath sounds. Abdominal:      General: Bowel sounds are normal. There is no distension. Palpations: Abdomen is soft. Abdomen is not rigid. Tenderness: There is no abdominal tenderness. There is no guarding or rebound. Comments: Healing midline incision   Genitourinary:     Comments: -Normal external female genitalia, normal Barth[place]'s and [place]'s glands                  -Normal midline urethral meatus.  No lesions notes                  -Bladder without fullness mass or tenderness                  -Vagina without lesion or discharge No significant cystocele or rectocele noted -Cervix normal appearing without visible lesions                  -Uterus with normal contour, mobility. No tenderness,                  -Adnexae without  mass or tenderness                  - Anus without fissure of lesion    Musculoskeletal:         General: Normal range of motion. Cervical back: Normal range of motion and neck supple. Lymphadenopathy:      Cervical: No cervical adenopathy. Upper Body:      Right upper body: No supraclavicular adenopathy. Left upper body: No supraclavicular adenopathy. Skin:     General: Skin is warm and dry. Neurological:      Mental Status: She is alert and oriented to person, place, and time.    Psychiatric:         Behavior: Behavior normal.         No results found for: ""  Lab Results   Component Value Date    K 3.5 11/10/2023     11/10/2023    CO2 27 11/10/2023    BUN 6 11/10/2023    CREATININE 0.67 11/10/2023    GLUF 85 10/04/2023    CALCIUM 8.7 11/10/2023    AST 11 (L) 10/04/2023    ALT 12 10/04/2023    ALKPHOS 81 10/04/2023    EGFR 123 11/10/2023     Lab Results   Component Value Date    WBC 10.76 (H) 11/10/2023    HGB 10.0 (L) 11/10/2023    HCT 32.1 (L) 11/10/2023    MCV 82 11/10/2023     11/10/2023     Lab Results   Component Value Date    NEUTROABS 6.40 10/04/2023        Trend:  No results found for: ""

## 2023-11-21 NOTE — ASSESSMENT & PLAN NOTE
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 We discussed treatment options and have recommended observation at this time. We have recommended no chemotherapy. We have stated that recurrence rates are in the low single digit percentage range however we should see her every 3 to 6 months for the next 5 years and annually thereafter. Additionally  and CEA can be drawn. Patient is comfortable with this plan. She is also reported she is planning to start oral contraceptive pills after New Year's. I discussed that this may reduce risks of further malignancy. We discussed that no genetic analysis is required at this time.

## 2023-11-21 NOTE — LETTER
November 21, 2023     Patient: Arlene Heaton  YOB: 2000  Date of Visit: 11/21/2023      To Whom it May Concern:    Diann Latonya is under my professional care. Alivia was seen in my office on 11/21/2023. Alivia may return to work on December 19, 2023 . If you have any questions or concerns, please don't hesitate to call.          Sincerely,          Tg Salazar MD        CC: No Recipients

## 2023-11-28 ENCOUNTER — DOCUMENTATION (OUTPATIENT)
Dept: GYNECOLOGIC ONCOLOGY | Facility: CLINIC | Age: 23
End: 2023-11-28

## 2023-11-28 NOTE — PROGRESS NOTES
Multidisciplinary Gynecologic Oncology Tumor Case Review       Physician Recommended Plan     Lucy Gallardo is a 24 y.o. 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 The group recommended routine observation, follow up CEA and  levels and imaging of remaining ovary. Follow-up appointment with Dr Juanito Clayton on 3/5/2023. NCCN guidelines were available for review. The final treatment plan will be left to the discretion of the patient and the treating physician. DISCLAIMERS:    TO THE TREATING PHYSICIAN:  This conference is a meeting of clinicians from various specialty areas who evaluate and discuss patients for whom a multidisciplinary treatment approach is being considered. Please note that the above opinion was a consensus of the conference attendees and is intended only to assist in quality care of the discussed patient. The responsibility for follow up on the input given during the conference, along with any final decisions regarding plan of care, is that of the patient and the patient's provider. Accordingly, appointments have only been recommended based on this information and have NOT been scheduled unless otherwise noted. TO THE PATIENT:  This summary is a brief record of major aspects of your cancer treatment. You may choose to share a copy with any of your doctors or nurses. However, this is not a detailed or comprehensive record of your care.

## 2024-01-18 ENCOUNTER — TELEPHONE (OUTPATIENT)
Dept: GYNECOLOGIC ONCOLOGY | Facility: CLINIC | Age: 24
End: 2024-01-18

## 2024-01-18 NOTE — TELEPHONE ENCOUNTER
Called patient and unable to leave message. Called and left message on Patient's mother ([phone]) to call back and discuss appointment change.  Changed appointment to Velvet Petersen instead of Dr. Garcia. Still at 2 pm on 3/5 in [place].

## 2024-03-04 ENCOUNTER — TELEPHONE (OUTPATIENT)
Dept: HEMATOLOGY ONCOLOGY | Facility: CLINIC | Age: 24
End: 2024-03-04

## 2024-03-04 ENCOUNTER — TELEPHONE (OUTPATIENT)
Dept: GYNECOLOGIC ONCOLOGY | Facility: CLINIC | Age: 24
End: 2024-03-04

## 2024-03-04 NOTE — TELEPHONE ENCOUNTER
Appointment Confirmation   Who are you speaking with? Patient   If it is not the patient, are they listed on an active communication consent form? N/A   Which provider is the appointment scheduled with?  BRENDA Olivier   When is the appointment scheduled?  Please list date and time 03/05/2024 @ 2PM    At which location is the appointment scheduled to take place? [facility]   Did caller verbalize understanding of appointment details? Yes     Patient is going for lab work tomorrow morning.

## 2024-03-04 NOTE — TELEPHONE ENCOUNTER
I left a message on the patient's father's phone reminding the patient to get the CEA and  blood work done prior to her appointment with Velvet Petersen on 3/5/2024 due to the patient's voicemail box being full and not accepting messages.

## 2024-03-05 ENCOUNTER — OFFICE VISIT (OUTPATIENT)
Dept: GYNECOLOGIC ONCOLOGY | Facility: CLINIC | Age: 24
End: 2024-03-05
Payer: COMMERCIAL

## 2024-03-05 ENCOUNTER — APPOINTMENT (OUTPATIENT)
Dept: LAB | Facility: CLINIC | Age: 24
End: 2024-03-05
Payer: COMMERCIAL

## 2024-03-05 VITALS
HEART RATE: 87 BPM | BODY MASS INDEX: 36.74 KG/M2 | TEMPERATURE: 98 F | WEIGHT: 228.6 LBS | DIASTOLIC BLOOD PRESSURE: 80 MMHG | OXYGEN SATURATION: 98 % | SYSTOLIC BLOOD PRESSURE: 122 MMHG | HEIGHT: 66 IN

## 2024-03-05 DIAGNOSIS — Z85.43 HISTORY OF OVARIAN CANCER: ICD-10-CM

## 2024-03-05 DIAGNOSIS — Z08 ENCOUNTER FOR FOLLOW-UP SURVEILLANCE OF OVARIAN CANCER: Primary | ICD-10-CM

## 2024-03-05 DIAGNOSIS — C56.2 MALIGNANT NEOPLASM OF LEFT OVARY (HCC): ICD-10-CM

## 2024-03-05 DIAGNOSIS — Z85.43 ENCOUNTER FOR FOLLOW-UP SURVEILLANCE OF OVARIAN CANCER: Primary | ICD-10-CM

## 2024-03-05 LAB
CANCER AG125 SERPL-ACNC: 10.7 U/ML (ref 0–35)
CEA SERPL-MCNC: 0.8 NG/ML (ref 0–3)

## 2024-03-05 PROCEDURE — 36415 COLL VENOUS BLD VENIPUNCTURE: CPT

## 2024-03-05 PROCEDURE — 82378 CARCINOEMBRYONIC ANTIGEN: CPT

## 2024-03-05 PROCEDURE — 86304 IMMUNOASSAY TUMOR CA 125: CPT

## 2024-03-05 PROCEDURE — 99214 OFFICE O/P EST MOD 30 MIN: CPT | Performed by: NURSE PRACTITIONER

## 2024-03-05 NOTE — PROGRESS NOTES
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 She is feeling well, and has no concerns. Her pelvic exam is normal. Her  and CEA are in process from this morning. Her PS is 0.    Patient will RTO in 6 months for her next surveillance visit, with pre-visit tumor markers. She is aware to call the office with any new pain, bleeding, discharge, or other new symptoms.         Relevant Orders        CEA    History of ovarian cancer    Relevant Orders        CEA           CHIEF COMPLAINT: Borderline ovarian cancer surveillance      Subjective:     Problem:  Cancer Staging   Encounter for follow-up surveillance of ovarian cancer  Staging form: Ovary, Fallopian Tube, Primary Peritoneal, AJCC 8th Edition  - Pathologic stage from 11/17/2023: FIGO Stage IA, calculated as Stage Unknown (pT1a, pNX, cM0) - Signed by Didier Garcia MD on 11/17/2023      Previous therapy:  Oncology History   Encounter for follow-up surveillance of ovarian cancer   11/17/2023 Initial Diagnosis    Ovarian cancer (HCC)     11/17/2023 -  Cancer Staged    Staging form: Ovary, Fallopian Tube, Primary Peritoneal, AJCC 8th Edition  - Pathologic stage from 11/17/2023: FIGO Stage IA, calculated as Stage Unknown (pT1a, pNX, cM0) - Signed by Didier Garcia MD on 11/17/2023  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  Final pathology report reveals borderline ovarian tumor.  Contralateral ovary and uterus were unremarkable.  Recommend observation.            Patient ID: Alivia Uribe is a 23 y.o. female  This is a 23 y.o. female last evaluated in November 2023 who presents to the office for borderline ovarian cancer surveillance. She has been well in the interim and is without acute complaints. She denies nausea or vomiting. Her appetite is appropriate. She is voiding and moving her bowels without difficulty. She denies abdominal or pelvic pain. The patient is without vaginal bleeding or discharge. She is ambulatory.          Review of Systems   Constitutional:  Negative for chills, fatigue, fever and unexpected weight change.   HENT:  Negative for nosebleeds.    Eyes: Negative.    Respiratory:  Negative for cough, chest tightness, shortness of breath and wheezing.    Cardiovascular:  Negative for chest pain, palpitations and leg swelling.   Gastrointestinal:  Negative for abdominal distention, abdominal pain, anal bleeding, blood in stool, constipation, diarrhea, nausea, rectal pain and vomiting.   Endocrine: Negative.    Genitourinary:  Negative for difficulty urinating, dysuria, frequency, hematuria, pelvic pain, urgency, vaginal bleeding, vaginal discharge and vaginal pain.   Musculoskeletal:  Negative for arthralgias and joint swelling.   Skin:  Negative for color change, pallor and rash.   Neurological:  Negative for dizziness, weakness, light-headedness, numbness and headaches.   Hematological: Negative.    Psychiatric/Behavioral: Negative.         Current Outpatient Medications   Medication Sig Dispense Refill    acetaminophen (TYLENOL) 325 mg tablet Take 3 tablets (975 mg total) by mouth every 8 (eight) hours  0    citalopram (CeleXA) 40 mg tablet Take 40 mg by mouth daily      docusate sodium (COLACE) 100 mg capsule Take 1 capsule (100 mg total) by mouth 2 (two) times a day  0    Methylphenidate HCl ER 27 MG TB24 Take 27 mg by mouth daily      oxyCODONE (ROXICODONE) 5 immediate release tablet 1 tablet by mouth every 4-6 hour as needed 20 tablet 0     "ibuprofen (MOTRIN) 600 mg tablet Take 1 tablet (600 mg total) by mouth every 6 (six) hours 30 tablet 0     No current facility-administered medications for this visit.       No Known Allergies    Past Medical History:   Diagnosis Date    ADHD     Anxiety     Autism     Depression     GERD (gastroesophageal reflux disease)     Hiatal hernia     Psychiatric disorder        Past Surgical History:   Procedure Laterality Date    BILATERAL SALPINGOOPHORECTOMY Left 2023    Procedure: SALPINGO-OOPHORECTOMY;  Surgeon: Didier Garcia MD;  Location[facility];  Service: Gynecology Oncology    MD EXPLORATORY LAPAROTOMY CELIOTOMY W/WO BIOPSY SPX N/A 2023    Procedure: LAPAROTOMY EXPLORATORY;  Surgeon: Didier Garcia MD;  Location: [facility];  Service: Gynecology Oncology       OB History          0    Para   0    Term   0       0    AB   0    Living   0         SAB   0    IAB   0    Ectopic   0    Multiple   0    Live Births   0                 No family history on file.    The following portions of the patient's history were reviewed and updated as appropriate: allergies, current medications, past family history, past medical history, past social history, past surgical history, and problem list.      Objective:    Blood pressure 122/80, pulse 87, temperature 98 °F (36.7 °C), height 5' 6\" (1.676 m), weight 104 kg (228 lb 9.6 oz), SpO2 98%.  Body mass index is 36.9 kg/m².    Physical Exam  Vitals reviewed. Exam conducted with a chaperone present.   Constitutional:       General: She is not in acute distress.     Appearance: Normal appearance. She is obese. She is not ill-appearing.   HENT:      Head: Normocephalic and atraumatic.      Mouth/Throat:      Mouth: Mucous membranes are moist.   Eyes:      General:         Right eye: No discharge.         Left eye: No discharge.      Conjunctiva/sclera: Conjunctivae normal.   Pulmonary:      Effort: Pulmonary effort is normal.   Abdominal:      " "Palpations: Abdomen is soft. There is no mass.      Tenderness: There is no abdominal tenderness.      Hernia: No hernia is present.   Genitourinary:     Comments: The external female genitalia is normal. The bartholin's, uretheral and skenes glands are normal. The urethral meatus is normal (midline with no lesions). Anus without fissure or lesion. Speculum exam reveals a grossly normal vagina. No masses, lesions,discharge or bleeding. No significant cystocele or rectocele noted. Bimanual exam notes a surgical absent cervix, uterus and adnexal structures. No masses or fullness. Bladder is without fullness, mass or tenderness.  Musculoskeletal:      Right lower leg: No edema.      Left lower leg: No edema.   Skin:     General: Skin is warm and dry.      Coloration: Skin is not jaundiced.      Findings: No rash.   Neurological:      General: No focal deficit present.      Mental Status: She is alert and oriented to person, place, and time.      Cranial Nerves: No cranial nerve deficit.      Sensory: No sensory deficit.      Motor: No weakness.      Gait: Gait normal.   Psychiatric:         Mood and Affect: Mood normal.         Behavior: Behavior normal.         Thought Content: Thought content normal.         Judgment: Judgment normal.           No results found for: \"\"  Lab Results   Component Value Date    WBC 10.76 (H) 11/10/2023    HGB 10.0 (L) 11/10/2023    HCT 32.1 (L) 11/10/2023    MCV 82 11/10/2023     11/10/2023     Lab Results   Component Value Date    K 3.5 11/10/2023     11/10/2023    CO2 27 11/10/2023    BUN 6 11/10/2023    CREATININE 0.67 11/10/2023    GLUF 85 10/04/2023    CALCIUM 8.7 11/10/2023    AST 11 (L) 10/04/2023    ALT 12 10/04/2023    ALKPHOS 81 10/04/2023    EGFR 123 11/10/2023        Trend:  No results found for: \"\"    "

## 2024-09-03 ENCOUNTER — APPOINTMENT (OUTPATIENT)
Dept: LAB | Facility: CLINIC | Age: 24
End: 2024-09-03
Payer: COMMERCIAL

## 2024-09-03 DIAGNOSIS — Z08 ENCOUNTER FOR FOLLOW-UP SURVEILLANCE OF OVARIAN CANCER: ICD-10-CM

## 2024-09-03 DIAGNOSIS — Z85.43 HISTORY OF OVARIAN CANCER: ICD-10-CM

## 2024-09-03 DIAGNOSIS — Z85.43 ENCOUNTER FOR FOLLOW-UP SURVEILLANCE OF OVARIAN CANCER: ICD-10-CM

## 2024-09-03 LAB
CANCER AG125 SERPL-ACNC: 7.5 U/ML (ref 0–35)
CEA SERPL-MCNC: 0.7 NG/ML (ref 0–3)

## 2024-09-03 PROCEDURE — 86304 IMMUNOASSAY TUMOR CA 125: CPT

## 2024-09-03 PROCEDURE — 82378 CARCINOEMBRYONIC ANTIGEN: CPT

## 2024-09-03 PROCEDURE — 36415 COLL VENOUS BLD VENIPUNCTURE: CPT

## 2024-09-09 ENCOUNTER — OFFICE VISIT (OUTPATIENT)
Dept: GYNECOLOGIC ONCOLOGY | Facility: CLINIC | Age: 24
End: 2024-09-09
Payer: COMMERCIAL

## 2024-09-09 VITALS
SYSTOLIC BLOOD PRESSURE: 130 MMHG | WEIGHT: 223 LBS | HEART RATE: 94 BPM | HEIGHT: 66 IN | BODY MASS INDEX: 35.84 KG/M2 | TEMPERATURE: 97.6 F | DIASTOLIC BLOOD PRESSURE: 82 MMHG | OXYGEN SATURATION: 99 % | RESPIRATION RATE: 18 BRPM

## 2024-09-09 DIAGNOSIS — Z08 ENCOUNTER FOR FOLLOW-UP SURVEILLANCE OF OVARIAN CANCER: ICD-10-CM

## 2024-09-09 DIAGNOSIS — C56.2 MALIGNANT NEOPLASM OF LEFT OVARY (HCC): ICD-10-CM

## 2024-09-09 DIAGNOSIS — Z85.43 ENCOUNTER FOR FOLLOW-UP SURVEILLANCE OF OVARIAN CANCER: ICD-10-CM

## 2024-09-09 DIAGNOSIS — Z85.43 HISTORY OF OVARIAN CANCER: Primary | ICD-10-CM

## 2024-09-09 PROCEDURE — 99214 OFFICE O/P EST MOD 30 MIN: CPT | Performed by: OBSTETRICS & GYNECOLOGY

## 2024-09-09 NOTE — PROGRESS NOTES
Assessment/Plan:    Problem List Items Addressed This Visit       Encounter for follow-up surveillance of ovarian cancer     Patient appears stable in clinical remission without evidence of disease.  Recommended follow-up in 3 months with repeat CEA .    Additionally patient will have a pelvic ultrasound to evaluate the contralateral ovary and make sure no cysts or masses are present there.    With regard to patient's facial hair a free testosterone will be ordered.         History of ovarian cancer - Primary    Relevant Orders    CEA        Testosterone, free, total    US pelvis complete w transvaginal    Malignant neoplasm of left ovary (HCC)         CHIEF COMPLAINT: Surveillance ovarian cancer      Problem:  Cancer Staging   Encounter for follow-up surveillance of ovarian cancer  Staging form: Ovary, Fallopian Tube, Primary Peritoneal, AJCC 8th Edition  - Pathologic stage from 11/17/2023: FIGO Stage IA, calculated as Stage Unknown (pT1a, pNX, cM0) - Signed by Didier Garcia MD on 11/17/2023        Previous therapy:  Oncology History   Encounter for follow-up surveillance of ovarian cancer   11/17/2023 Initial Diagnosis    Ovarian cancer (HCC)     11/17/2023 -  Cancer Staged    Staging form: Ovary, Fallopian Tube, Primary Peritoneal, AJCC 8th Edition  - Pathologic stage from 11/17/2023: FIGO Stage IA, calculated as Stage Unknown (pT1a, pNX, cM0) - Signed by Didier Garcia MD on 11/17/2023  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  Final pathology report reveals borderline ovarian tumor.  Contralateral ovary and uterus were unremarkable.  Recommend observation.           Patient ID: Alivia Uribe is a 24 y.o. female  Patient is  "a very pleasant 24-year-old female with a history of mucinous borderline tumor of the ovary status post open laparotomy and resection of 20 x 30 cm mass for stage Ia disease.  The patient was treated with surgery alone in November 2023.  She presents today in follow-up.  She is without complaint.    The patient's most recent CEA is 0.7.  Her  was 7.    Today, the patient is doing well.  She denies significant abdominal pain, pelvic pain, nausea, vomiting, constipation, diarrhea, fevers, chills.  She notes that her periods are regular.  She has noticed some dark hair on her face which is a change.  She also notes that her bladder is somewhat weaker than it has been before.           The following portions of the patient's history were reviewed and updated as appropriate: allergies, current medications, past family history, past medical history, past social history, past surgical history, and problem list.    Review of Systems    Current Outpatient Medications   Medication Sig Dispense Refill    acetaminophen (TYLENOL) 325 mg tablet Take 3 tablets (975 mg total) by mouth every 8 (eight) hours  0    citalopram (CeleXA) 40 mg tablet Take 40 mg by mouth daily      ibuprofen (MOTRIN) 600 mg tablet Take 1 tablet (600 mg total) by mouth every 6 (six) hours 30 tablet 0    Methylphenidate HCl ER 27 MG TB24 Take 27 mg by mouth daily      docusate sodium (COLACE) 100 mg capsule Take 1 capsule (100 mg total) by mouth 2 (two) times a day (Patient not taking: Reported on 9/9/2024)  0    oxyCODONE (ROXICODONE) 5 immediate release tablet 1 tablet by mouth every 4-6 hour as needed (Patient not taking: Reported on 9/9/2024) 20 tablet 0     No current facility-administered medications for this visit.           Objective:    Blood pressure 130/82, pulse 94, temperature 97.6 °F (36.4 °C), temperature source Temporal, resp. rate 18, height 5' 6\" (1.676 m), weight 101 kg (223 lb), SpO2 99%.  Body mass index is 35.99 kg/m².  Body " surface area is 2.09 meters squared.    Physical Exam    Lab Results   Component Value Date     7.5 09/03/2024     Lab Results   Component Value Date    K 3.5 11/10/2023     11/10/2023    CO2 27 11/10/2023    BUN 6 11/10/2023    CREATININE 0.67 11/10/2023    GLUF 85 10/04/2023    CALCIUM 8.7 11/10/2023    AST 11 (L) 10/04/2023    ALT 12 10/04/2023    ALKPHOS 81 10/04/2023    EGFR 123 11/10/2023     Lab Results   Component Value Date    WBC 10.76 (H) 11/10/2023    HGB 10.0 (L) 11/10/2023    HCT 32.1 (L) 11/10/2023    MCV 82 11/10/2023     11/10/2023     Lab Results   Component Value Date    NEUTROABS 6.40 10/04/2023        Trend:  Lab Results   Component Value Date     7.5 09/03/2024     10.7 03/05/2024

## 2024-09-09 NOTE — ASSESSMENT & PLAN NOTE
Patient appears stable in clinical remission without evidence of disease.  Recommended follow-up in 3 months with repeat CEA .    Additionally patient will have a pelvic ultrasound to evaluate the contralateral ovary and make sure no cysts or masses are present there.    With regard to patient's facial hair a free testosterone will be ordered.

## 2024-10-29 ENCOUNTER — OFFICE VISIT (OUTPATIENT)
Dept: URGENT CARE | Facility: CLINIC | Age: 24
End: 2024-10-29
Payer: COMMERCIAL

## 2024-10-29 ENCOUNTER — APPOINTMENT (OUTPATIENT)
Dept: RADIOLOGY | Facility: CLINIC | Age: 24
End: 2024-10-29
Payer: COMMERCIAL

## 2024-10-29 VITALS
OXYGEN SATURATION: 99 % | DIASTOLIC BLOOD PRESSURE: 78 MMHG | RESPIRATION RATE: 16 BRPM | BODY MASS INDEX: 36.96 KG/M2 | TEMPERATURE: 99.1 F | HEIGHT: 66 IN | WEIGHT: 230 LBS | HEART RATE: 113 BPM | SYSTOLIC BLOOD PRESSURE: 126 MMHG

## 2024-10-29 DIAGNOSIS — J20.9 ACUTE BRONCHITIS, UNSPECIFIED ORGANISM: Primary | ICD-10-CM

## 2024-10-29 DIAGNOSIS — R05.1 ACUTE COUGH: ICD-10-CM

## 2024-10-29 PROCEDURE — 71046 X-RAY EXAM CHEST 2 VIEWS: CPT

## 2024-10-29 PROCEDURE — G0382 LEV 3 HOSP TYPE B ED VISIT: HCPCS

## 2024-10-29 PROCEDURE — S9083 URGENT CARE CENTER GLOBAL: HCPCS

## 2024-10-29 RX ORDER — METHYLPREDNISOLONE 4 MG/1
TABLET ORAL
Qty: 21 EACH | Refills: 0 | Status: SHIPPED | OUTPATIENT
Start: 2024-10-29

## 2024-10-29 RX ORDER — BENZONATATE 200 MG/1
200 CAPSULE ORAL 3 TIMES DAILY PRN
Qty: 20 CAPSULE | Refills: 0 | Status: SHIPPED | OUTPATIENT
Start: 2024-10-29

## 2024-10-29 NOTE — PROGRESS NOTES
[facility]        NAME: Alivia Uribe is a 24 y.o. female  : 2000    MRN: [id number]  DATE: 2024  TIME: 10:47 AM    Assessment and Plan   Acute bronchitis, unspecified organism [J20.9]  1. Acute bronchitis, unspecified organism  methylPREDNISolone 4 MG tablet therapy pack    benzonatate (TESSALON) 200 MG capsule      2. Acute cough  XR chest pa and lateral            Patient Instructions     Take steroid as prescribed  Take Tessalon as needed for cough  Plenty of fluids  Can use honey   Cool mist humidifier   Warm gargle with salt water for sore throat   Use Tylenol as needed for fever or pain    Follow up with PCP in 3-5 days.  Proceed to  ER if symptoms worsen.    If tests are performed, our office will contact you with results only if changes need to made to the care plan discussed with you at the visit. [facility]    Chief Complaint     Chief Complaint   Patient presents with    Cold Like Symptoms     Started 2 weeks ago  Coughing productive, and non productive           History of Present Illness       Cough  This is a new problem. Episode onset: 2 weeks. The cough is Productive of sputum. Pertinent negatives include no chest pain, chills, ear pain, fever, postnasal drip, rhinorrhea, sore throat, shortness of breath or wheezing. Treatments tried: generic cold medication. There is no history of asthma.       Review of Systems   Review of Systems   Constitutional:  Negative for chills, diaphoresis, fatigue and fever.   HENT:  Negative for congestion, ear pain, postnasal drip, rhinorrhea, sinus pressure, sore throat and trouble swallowing.    Respiratory:  Positive for cough. Negative for chest tightness, shortness of breath and wheezing.    Cardiovascular:  Negative for chest pain and palpitations.   Skin:  Negative for color change.   Neurological:  Negative for dizziness and light-headedness.   Psychiatric/Behavioral:  Negative for  sleep disturbance.          Current Medications       Current Outpatient Medications:     benzonatate (TESSALON) 200 MG capsule, Take 1 capsule (200 mg total) by mouth 3 (three) times a day as needed for cough, Disp: 20 capsule, Rfl: 0    citalopram (CeleXA) 40 mg tablet, Take 40 mg by mouth daily, Disp: , Rfl:     Methylphenidate HCl ER 27 MG TB24, Take 27 mg by mouth daily, Disp: , Rfl:     methylPREDNISolone 4 MG tablet therapy pack, Use as directed on package, Disp: 21 each, Rfl: 0    acetaminophen (TYLENOL) 325 mg tablet, Take 3 tablets (975 mg total) by mouth every 8 (eight) hours (Patient not taking: Reported on 10/29/2024), Disp: , Rfl: 0    docusate sodium (COLACE) 100 mg capsule, Take 1 capsule (100 mg total) by mouth 2 (two) times a day (Patient not taking: Reported on 9/9/2024), Disp: , Rfl: 0    ibuprofen (MOTRIN) 600 mg tablet, Take 1 tablet (600 mg total) by mouth every 6 (six) hours, Disp: 30 tablet, Rfl: 0    oxyCODONE (ROXICODONE) 5 immediate release tablet, 1 tablet by mouth every 4-6 hour as needed (Patient not taking: Reported on 9/9/2024), Disp: 20 tablet, Rfl: 0    Current Allergies     Allergies as of 10/29/2024    (No Known Allergies)            The following portions of the patient's history were reviewed and updated as appropriate: allergies, current medications, past family history, past medical history, past social history, past surgical history and problem list.     Past Medical History:   Diagnosis Date    ADHD     Anxiety     Autism     Depression     GERD (gastroesophageal reflux disease)     Hiatal hernia     Psychiatric disorder        Past Surgical History:   Procedure Laterality Date    BILATERAL SALPINGOOPHORECTOMY Left 11/7/2023    Procedure: SALPINGO-OOPHORECTOMY;  Surgeon: Didier Garcia MD;  Location[facility];  Service: Gynecology Oncology    OR EXPLORATORY LAPAROTOMY CELIOTOMY W/WO BIOPSY SPX N/A 11/7/2023    Procedure: LAPAROTOMY EXPLORATORY;  Surgeon: Didier SUMMERS  "MD Jose;  Location: [facility];  Service: Gynecology Oncology       No family history on file.      Medications have been verified.        Objective   /78   Pulse (!) 113   Temp 99.1 °F (37.3 °C)   Resp 16   Ht 5' 6\" (1.676 m)   Wt 104 kg (230 lb)   LMP  (LMP Unknown)   SpO2 99%   BMI 37.12 kg/m²        Physical Exam     Physical Exam  Constitutional:       General: She is not in acute distress.     Appearance: Normal appearance. She is not ill-appearing.   HENT:      Head: Normocephalic.      Right Ear: Tympanic membrane and external ear normal.      Left Ear: Tympanic membrane and external ear normal.      Nose: No congestion.      Mouth/Throat:      Mouth: Mucous membranes are moist.      Pharynx: Oropharynx is clear.   Cardiovascular:      Rate and Rhythm: Normal rate and regular rhythm.      Pulses: Normal pulses.      Heart sounds: Normal heart sounds.   Pulmonary:      Effort: Pulmonary effort is normal. No respiratory distress.      Breath sounds: Normal breath sounds. No stridor. No wheezing, rhonchi or rales.   Lymphadenopathy:      Cervical: No cervical adenopathy.   Skin:     General: Skin is warm and dry.   Neurological:      General: No focal deficit present.      Mental Status: She is alert and oriented to person, place, and time. Mental status is at baseline.   Psychiatric:         Mood and Affect: Mood normal.         Behavior: Behavior normal.         Thought Content: Thought content normal.         Judgment: Judgment normal.                   "

## 2024-10-29 NOTE — PATIENT INSTRUCTIONS
Take steroid as prescribed  Take Tessalon as needed for cough  Plenty of fluids  Can use honey   Cool mist humidifier   Warm gargle with salt water for sore throat   Use Tylenol as needed for fever or pain    Follow up with PCP in 3-5 days.  Proceed to  ER if symptoms worsen.    If tests are performed, our office will contact you with results only if changes need to made to the care plan discussed with you at the visit. You can review your full results on [facility].

## 2024-12-09 ENCOUNTER — HOSPITAL ENCOUNTER (OUTPATIENT)
Dept: ULTRASOUND IMAGING | Facility: HOSPITAL | Age: 24
Discharge: HOME | End: 2024-12-09
Payer: COMMERCIAL

## 2024-12-09 ENCOUNTER — APPOINTMENT (OUTPATIENT)
Dept: LAB | Facility: HOSPITAL | Age: 24
End: 2024-12-09
Payer: COMMERCIAL

## 2024-12-09 DIAGNOSIS — Z85.43 HISTORY OF OVARIAN CANCER: ICD-10-CM

## 2024-12-09 LAB
CANCER AG125 SERPL-ACNC: 9.8 U/ML (ref 0–35)
CEA SERPL-MCNC: 0.7 NG/ML (ref 0–3)

## 2024-12-09 PROCEDURE — 84402 ASSAY OF FREE TESTOSTERONE: CPT

## 2024-12-09 PROCEDURE — 84403 ASSAY OF TOTAL TESTOSTERONE: CPT

## 2024-12-09 PROCEDURE — 36415 COLL VENOUS BLD VENIPUNCTURE: CPT

## 2024-12-09 PROCEDURE — 76830 TRANSVAGINAL US NON-OB: CPT

## 2024-12-09 PROCEDURE — 76856 US EXAM PELVIC COMPLETE: CPT

## 2024-12-09 PROCEDURE — 82378 CARCINOEMBRYONIC ANTIGEN: CPT

## 2024-12-09 PROCEDURE — 86304 IMMUNOASSAY TUMOR CA 125: CPT

## 2024-12-11 LAB
TESTOST FREE SERPL-MCNC: 1.4 PG/ML (ref 0–4.2)
TESTOST SERPL-MCNC: 32 NG/DL (ref 13–71)

## 2025-03-10 ENCOUNTER — OFFICE VISIT (OUTPATIENT)
Dept: GYNECOLOGIC ONCOLOGY | Facility: CLINIC | Age: 25
End: 2025-03-10
Payer: COMMERCIAL

## 2025-03-10 VITALS
SYSTOLIC BLOOD PRESSURE: 126 MMHG | HEART RATE: 211 BPM | TEMPERATURE: 98.2 F | RESPIRATION RATE: 16 BRPM | HEIGHT: 66 IN | BODY MASS INDEX: 35.74 KG/M2 | DIASTOLIC BLOOD PRESSURE: 80 MMHG | WEIGHT: 222.4 LBS | OXYGEN SATURATION: 96 %

## 2025-03-10 DIAGNOSIS — Z85.43 HISTORY OF OVARIAN CANCER: Primary | ICD-10-CM

## 2025-03-10 DIAGNOSIS — C56.2 MALIGNANT NEOPLASM OF LEFT OVARY (HCC): ICD-10-CM

## 2025-03-10 PROCEDURE — 99214 OFFICE O/P EST MOD 30 MIN: CPT | Performed by: OBSTETRICS & GYNECOLOGY

## 2025-03-10 NOTE — ASSESSMENT & PLAN NOTE
Patient has a history of a stage Ia borderline mucinous ovarian cancer status post resection of the left tube and ovary for a 22 cm mass.  She has no evidence of recurrence of disease on blood work or exam or pelvic ultrasound today.  Patient will follow-up in 3 months with repeat pelvic ultrasound and CEA and .  Orders:    CEA; Future    ; Future    US pelvis complete w transvaginal; Future

## 2025-03-10 NOTE — PROGRESS NOTES
Name: Alivia Uribe      : 2000      MRN: [id number]  Encounter Provider: Didier Garcia MD  Encounter Date: 3/10/2025   Encounter department: [facility] [place]  :  Assessment & Plan  History of ovarian cancer  Patient has a history of a stage Ia borderline mucinous ovarian cancer status post resection of the left tube and ovary for a 22 cm mass.  She has no evidence of recurrence of disease on blood work or exam or pelvic ultrasound today.  Patient will follow-up in 3 months with repeat pelvic ultrasound and CEA and .  Orders:    CEA; Future    ; Future    US pelvis complete w transvaginal; Future    Malignant neoplasm of left ovary (HCC)  Remains in clinical remission.               History of Present Illness   Reason for Visit / CC: Surveillance ovarian cancer   Alivia Uribe is a 24 y.o. female   Patient is a very pleasant 24-year-old female with a history of stage Ia borderline mucinous ovarian cancer status post open laparotomy left salpingo-oophorectomy.  She had no further treatment.  The contralateral ovary remains in place.  She presents today in follow-up.  She is without complaint.  Her  and C CEA are unremarkable.  Recent pelvic ultrasound reveals the following:  IMPRESSION:     3.3 cm simple right ovarian cyst.     Status post left oophorectomy. Unremarkable appearance of the left adnexa.    Today, the patient is doing well.  She denies significant abdominal pain, pelvic pain, nausea, vomiting, constipation, diarrhea, fevers, chills, or vaginal bleeding.         Pertinent Medical History            Oncology History   Cancer Staging   Encounter for follow-up surveillance of ovarian cancer  Staging form: Ovary, Fallopian Tube, Primary Peritoneal, AJCC 8th Edition  - Pathologic stage from 2023: FIGO Stage IA, calculated as Stage Unknown (pT1a, pNX, cM0) - Signed by Didier Garcia MD on 2023  Histopathologic type: Mucinous cystic  "tumor of borderline malignancy  Stage prefix: Initial diagnosis  Histologic grade (G): GB  Histologic grading system: 4 grade system  Residual tumor (R): R0 - None  Oncology History   Encounter for follow-up surveillance of ovarian cancer   11/17/2023 Initial Diagnosis    Ovarian cancer (HCC)     11/17/2023 -  Cancer Staged    Staging form: Ovary, Fallopian Tube, Primary Peritoneal, AJCC 8th Edition  - Pathologic stage from 11/17/2023: FIGO Stage IA, calculated as Stage Unknown (pT1a, pNX, cM0) - Signed by Didier Garcia MD on 11/17/2023  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  Final pathology report reveals borderline ovarian tumor.  Contralateral ovary and uterus were unremarkable.  Recommend observation.        Review of Systems   Constitutional: Negative.    HENT: Negative.     Eyes: Negative.    Respiratory: Negative.     Cardiovascular: Negative.    Gastrointestinal: Negative.    Endocrine: Negative.    Genitourinary: Negative.    Musculoskeletal: Negative.    Skin: Negative.    Neurological: Negative.    Hematological: Negative.    Psychiatric/Behavioral: Negative.      A complete review of systems is negative other than that noted above in the HPI.       Objective   /80 (BP Location: Right arm, Patient Position: Sitting, Cuff Size: Large)   Pulse (!) 211   Temp 98.2 °F (36.8 °C) (Temporal)   Resp 16   Ht 5' 6\" (1.676 m)   Wt 101 kg (222 lb 6.4 oz)   SpO2 96%   BMI 35.90 kg/m²     Body mass index is 35.9 kg/m².  Pain Screening:  Pain Score: 0-No pain  ECOG   0  Physical Exam  Constitutional:       Appearance: She is well-developed.   HENT:      Head: Normocephalic and atraumatic.   Eyes:      Pupils: Pupils are equal, " "round, and reactive to light.   Cardiovascular:      Rate and Rhythm: Normal rate and regular rhythm.      Heart sounds: Normal heart sounds.   Pulmonary:      Effort: Pulmonary effort is normal. No respiratory distress.      Breath sounds: Normal breath sounds.   Abdominal:      General: Bowel sounds are normal. There is no distension.      Palpations: Abdomen is soft. Abdomen is not rigid.      Tenderness: There is no abdominal tenderness. There is no guarding or rebound.      Comments: Well-healed midline incision   Genitourinary:     Comments: -Normal external female genitalia, normal Bartholin's and [place]'s glands                  -Normal midline urethral meatus. No lesions notes                  -Bladder without fullness mass or tenderness                  -Vagina without lesion or discharge No significant cystocele or rectocele noted                  -Cervix normal appearing without visible lesions                  -Uterus with normal contour, mobility. No tenderness,                  -Adnexae without  mass or tenderness                  - Anus without fissure of lesion    Musculoskeletal:         General: Normal range of motion.      Cervical back: Normal range of motion and neck supple.   Lymphadenopathy:      Cervical: No cervical adenopathy.      Upper Body:      Right upper body: No supraclavicular adenopathy.      Left upper body: No supraclavicular adenopathy.   Skin:     General: Skin is warm and dry.   Neurological:      Mental Status: She is alert and oriented to person, place, and time.   Psychiatric:         Behavior: Behavior normal.          Labs: I have reviewed pertinent labs.   Lab Results   Component Value Date/Time     9.8 12/09/2024 11:07 AM     No results found for: \"NA\", \"K\", \"CL\", \"CO2\", \"ANIONGAP\", \"BUN\", \"CREATININE\", \"GLUCOSE\", \"GLUF\", \"CALCIUM\", \"CORRECTEDCA\", \"AST\", \"ALT\", \"ALKPHOS\", \"PROT\", \"BILITOT\", \"EGFR\"  No results found for: \"WBC\", \"HGB\", \"HCT\", \"MCV\", \"PLT\"  No " "results found for: \"NEUTROABS\"     Trend:  Lab Results   Component Value Date     9.8 12/09/2024     7.5 09/03/2024     10.7 03/05/2024               "

## 2025-06-10 ENCOUNTER — HOSPITAL ENCOUNTER (OUTPATIENT)
Dept: ULTRASOUND IMAGING | Facility: HOSPITAL | Age: 25
Discharge: HOME | End: 2025-06-10
Payer: COMMERCIAL

## 2025-06-10 DIAGNOSIS — Z85.43 HISTORY OF OVARIAN CANCER: ICD-10-CM

## 2025-06-10 PROCEDURE — 76830 TRANSVAGINAL US NON-OB: CPT

## 2025-06-10 PROCEDURE — 76856 US EXAM PELVIC COMPLETE: CPT

## 2025-06-13 ENCOUNTER — APPOINTMENT (OUTPATIENT)
Dept: LAB | Facility: CLINIC | Age: 25
End: 2025-06-13
Payer: COMMERCIAL

## 2025-06-13 DIAGNOSIS — Z85.43 HISTORY OF OVARIAN CANCER: ICD-10-CM

## 2025-06-13 LAB
CANCER AG125 SERPL-ACNC: 7.8 U/ML (ref 0–35)
CEA SERPL-MCNC: 0.7 NG/ML (ref 0–3)

## 2025-06-13 PROCEDURE — 82378 CARCINOEMBRYONIC ANTIGEN: CPT

## 2025-06-13 PROCEDURE — 36415 COLL VENOUS BLD VENIPUNCTURE: CPT

## 2025-06-13 PROCEDURE — 86304 IMMUNOASSAY TUMOR CA 125: CPT

## 2025-06-16 ENCOUNTER — OFFICE VISIT (OUTPATIENT)
Dept: GYNECOLOGIC ONCOLOGY | Facility: CLINIC | Age: 25
End: 2025-06-16
Payer: COMMERCIAL

## 2025-06-16 VITALS
WEIGHT: 219 LBS | BODY MASS INDEX: 35.2 KG/M2 | DIASTOLIC BLOOD PRESSURE: 68 MMHG | OXYGEN SATURATION: 99 % | TEMPERATURE: 98.5 F | SYSTOLIC BLOOD PRESSURE: 120 MMHG | RESPIRATION RATE: 18 BRPM | HEIGHT: 66 IN | HEART RATE: 77 BPM

## 2025-06-16 DIAGNOSIS — C56.2 MALIGNANT NEOPLASM OF LEFT OVARY (HCC): Primary | ICD-10-CM

## 2025-06-16 DIAGNOSIS — N83.201 CYST OF RIGHT OVARY: ICD-10-CM

## 2025-06-16 PROBLEM — N83.209 OVARIAN CYST: Status: ACTIVE | Noted: 2025-06-16

## 2025-06-16 PROCEDURE — 99213 OFFICE O/P EST LOW 20 MIN: CPT | Performed by: OBSTETRICS & GYNECOLOGY

## 2025-06-16 NOTE — ASSESSMENT & PLAN NOTE
Patient has a history of left salpingo-oophorectomy for large 30 cm ovarian mass.  This remains in clinical remission by exam and blood work.  Additionally contralateral ovary has a small cyst but this will be followed up.    Will see the patient back in 3 months for repeat evaluation with blood work and ultrasound  Orders:    CEA; Future    ; Future    US pelvis complete w transvaginal; Future

## 2025-06-16 NOTE — PROGRESS NOTES
Name: Alivia Uribe      : 2000      MRN: [id number]  Encounter Provider: Didier Garcia MD  Encounter Date: 2025   Encounter department: [facility] [place]  :  Assessment & Plan  Malignant neoplasm of left ovary (HCC)  Patient has a history of left salpingo-oophorectomy for large 30 cm ovarian mass.  This remains in clinical remission by exam and blood work.  Additionally contralateral ovary has a small cyst but this will be followed up.    Will see the patient back in 3 months for repeat evaluation with blood work and ultrasound  Orders:    CEA; Future    ; Future    US pelvis complete w transvaginal; Future    Cyst of right ovary  Patient has a new 4 cm simple cyst of the right ovary.  In general this would not be particularly worrisome however given the patient's history of a large borderline tumor we will follow this up with imaging in 3 months with a repeat pelvic ultrasound.               History of Present Illness   Reason for Visit / CC: Surveillance ovarian cancer   Alivia Uribe is a 25 y.o. female   Patient is a very pleasant 25-year-old female with a history of approximately stage Ia 30 cm borderline mucinous ovarian cancer.  The patient underwent exploratory laparotomy and left salpingo-oophorectomy.  No chemotherapy was required the patient has been in remission since this time.  Patient presents today for surveillance.  Her  remains normal at 7.8 her CEA remains normal at 0.7.    Most recent ultrasound reveals the following:  FINDINGS:     UTERUS:  The uterus is anteverted in position, measuring 7.5 x 4.1 x 4.4 cm.  The uterus has a normal contour and echotexture.  The cervix appears within normal limits.     ENDOMETRIUM:  The endometrial echo complex has an AP caliber of 2.0 mm.  Appearance within normal limits. Small quantity of endocervical fluid.     OVARIES/ADNEXA:  Right ovary: 5.5 x 3.8 x 3.6 cm. 39.1 mL.  Ovarian Doppler flow is within  normal limits.  4.7 x 2.6 x 3.8 cm unilocular cyst.     Left ovary: Surgically absent     OTHER:  No free fluid or loculated fluid collections.     IMPRESSION:     4.7 cm right adnexal unilocular cyst. Ultrasound O RADS 2.  No folllow-up necessary.      Today, the patient is doing well.  She denies significant abdominal pain, pelvic pain, nausea, vomiting, constipation, diarrhea, fevers, chills, or vaginal bleeding.  The patient is not on oral contraceptives.         Pertinent Medical History        Oncology History   Cancer Staging   Encounter for follow-up surveillance of ovarian cancer  Staging form: Ovary, Fallopian Tube, Primary Peritoneal, AJCC 8th Edition  - Pathologic stage from 11/17/2023: FIGO Stage IA, calculated as Stage Unknown (pT1a, pNX, cM0) - Signed by Didier Garcia MD on 11/17/2023  Histopathologic type: Mucinous cystic tumor of borderline malignancy  Stage prefix: Initial diagnosis  Histologic grade (G): GB  Histologic grading system: 4 grade system  Residual tumor (R): R0  Oncology History   Encounter for follow-up surveillance of ovarian cancer   11/17/2023 Initial Diagnosis    Ovarian cancer (HCC)     11/17/2023 -  Cancer Staged    Staging form: Ovary, Fallopian Tube, Primary Peritoneal, AJCC 8th Edition  - Pathologic stage from 11/17/2023: FIGO Stage IA, calculated as Stage Unknown (pT1a, pNX, cM0) - Signed by Didier Garcia MD on 11/17/2023  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  Final pathology report reveals borderline ovarian tumor.  Contralateral ovary and uterus were unremarkable.  Recommend observation.        Review of Systems A complete review of systems is negative other than that noted above in the  "HPI.       Objective   Resp 18   Ht 5' 6\" (1.676 m)   BMI 35.90 kg/m²     Body mass index is 35.9 kg/m².  Pain Screening:  Pain Score: 0-No pain  ECOG   0  Physical Exam  Constitutional:       Appearance: Alivia is well-developed.   HENT:      Head: Normocephalic and atraumatic.     Eyes:      Pupils: Pupils are equal, round, and reactive to light.       Cardiovascular:      Rate and Rhythm: Normal rate and regular rhythm.      Heart sounds: Normal heart sounds.   Pulmonary:      Effort: Pulmonary effort is normal. No respiratory distress.      Breath sounds: Normal breath sounds.   Abdominal:      General: Bowel sounds are normal. There is no distension.      Palpations: Abdomen is soft. Abdomen is not rigid.      Tenderness: There is no abdominal tenderness. There is no guarding or rebound.      Comments: Well-healed midline incision   Genitourinary:     Comments: -Normal external female genitalia, normal Bartholin's and [place]'s glands                  -Normal midline urethral meatus. No lesions notes                  -Bladder without fullness mass or tenderness                  -Vagina without lesion or discharge No significant cystocele or rectocele noted                  -Cervix normal appearing without visible lesions                  -Uterus with normal contour, mobility. No tenderness,                  -Adnexa without  mass or tenderness                  - Anus without fissure of lesion      Musculoskeletal:         General: Normal range of motion.      Cervical back: Normal range of motion and neck supple.   Lymphadenopathy:      Cervical: No cervical adenopathy.      Upper Body:      Right upper body: No supraclavicular adenopathy.      Left upper body: No supraclavicular adenopathy.     Skin:     General: Skin is warm and dry.     Neurological:      Mental Status: Alivia is alert and oriented to person, place, and time.     Psychiatric:         Behavior: Behavior normal.          Labs: I have reviewed " "pertinent labs.   Lab Results   Component Value Date/Time     7.8 06/13/2025 08:40 AM     No results found for: \"NA\", \"K\", \"CL\", \"CO2\", \"ANIONGAP\", \"BUN\", \"CREATININE\", \"GLUCOSE\", \"GLUF\", \"CALCIUM\", \"CORRECTEDCA\", \"AST\", \"ALT\", \"ALKPHOS\", \"PROT\", \"BILITOT\", \"EGFR\"  No results found for: \"WBC\", \"HGB\", \"HCT\", \"MCV\", \"PLT\"  No results found for: \"NEUTROABS\"     Trend:  Lab Results   Component Value Date     7.8 06/13/2025     9.8 12/09/2024     7.5 09/03/2024     10.7 03/05/2024               "

## 2025-06-16 NOTE — ASSESSMENT & PLAN NOTE
Patient has a new 4 cm simple cyst of the right ovary.  In general this would not be particularly worrisome however given the patient's history of a large borderline tumor we will follow this up with imaging in 3 months with a repeat pelvic ultrasound.

## 2025-07-10 ENCOUNTER — TELEPHONE (OUTPATIENT)
Dept: GYNECOLOGIC ONCOLOGY | Facility: CLINIC | Age: 25
End: 2025-07-10

## 2025-07-10 NOTE — TELEPHONE ENCOUNTER
Attempted to contact the patient in regards to her 09/16/2025 appointment with , we do have to reschedule this appointment due to the provider not being in the office that day. I was unable to leave a voicemail due to the mailbox being full.

## (undated) DEVICE — DRAPE FLUID WARMER (BIRD BATH)

## (undated) DEVICE — SUT PLAIN 3-0 CTX 27 IN 873H

## (undated) DEVICE — SUT PDS II 1 TP-1 96 IN Z880G

## (undated) DEVICE — SUT PLAIN 2-0 CTX 27 IN 872H

## (undated) DEVICE — PLUMEPEN PRO 10FT

## (undated) DEVICE — BULB SYRINGE,IRRIGATION WITH PROTECTIVE CAP: Brand: DOVER

## (undated) DEVICE — DRAPE LAPAROTOMY W/POUCHES

## (undated) DEVICE — BOWL: 16OZ PEELPOUCH 75/CS 16/PLT: Brand: MEDEGEN MEDICAL PRODUCTS, LLC

## (undated) DEVICE — CAUTERY TIP POLISHER: Brand: DEVON

## (undated) DEVICE — TELFA NON-ADHERENT ABSORBENT DRESSING: Brand: TELFA

## (undated) DEVICE — SPONGE LAP 18 X 18 IN STRL RFD

## (undated) DEVICE — ADHESIVE SKIN HIGH VISCOSITY EXOFIN 1ML

## (undated) DEVICE — CHLORAPREP HI-LITE 26ML ORANGE

## (undated) DEVICE — SUT STRATAFIX SPIRAL PLUS 3-0 PS-2 30 X 30 CM SXMP2B408

## (undated) DEVICE — EXIDINE 4 PCT

## (undated) DEVICE — GAUZE SPONGES,16 PLY: Brand: CURITY

## (undated) DEVICE — GLOVE INDICATOR PI UNDERGLOVE SZ 7.5 BLUE

## (undated) DEVICE — INTENDED FOR TISSUE SEPARATION, AND OTHER PROCEDURES THAT REQUIRE A SHARP SURGICAL BLADE TO PUNCTURE OR CUT.: Brand: BARD-PARKER ® CARBON RIB-BACK BLADES

## (undated) DEVICE — MEDI-VAC YANK SUCT HNDL W/TPRD BULBOUS TIP: Brand: CARDINAL HEALTH

## (undated) DEVICE — SUT VICRYL 0 CT-1 36 IN J946H

## (undated) DEVICE — GLOVE PI ULTRA TOUCH SZ.7.5

## (undated) DEVICE — INTENDED FOR TISSUE SEPARATION, AND OTHER PROCEDURES THAT REQUIRE A SHARP SURGICAL BLADE TO PUNCTURE OR CUT.: Brand: BARD-PARKER SAFETY BLADES SIZE 15, STERILE

## (undated) DEVICE — UNDER BUTTOCKS DRAPE: Brand: CONVERTORS

## (undated) DEVICE — PENCIL ELECTROSURG E-Z CLEAN -0035H

## (undated) DEVICE — ELECTRODE BLADE MOD  E-Z CLEAN 6.5IN -0014M

## (undated) DEVICE — DRAPE TOWEL: Brand: CONVERTORS

## (undated) DEVICE — SPONGE STICK WITH PVP-I: Brand: KENDALL

## (undated) DEVICE — SUT VICRYL 3-0 SH 27 IN J416H

## (undated) DEVICE — NEEDLE COUNTER LG W/RULER

## (undated) DEVICE — SUT VICRYL 0 REEL 54 IN J287G

## (undated) DEVICE — TOWEL SET X-RAY

## (undated) DEVICE — TRAY FOLEY 16FR URIMETER SILICONE SURESTEP